# Patient Record
Sex: FEMALE | Race: WHITE | NOT HISPANIC OR LATINO | Employment: FULL TIME | URBAN - METROPOLITAN AREA
[De-identification: names, ages, dates, MRNs, and addresses within clinical notes are randomized per-mention and may not be internally consistent; named-entity substitution may affect disease eponyms.]

---

## 2017-05-11 ENCOUNTER — ALLSCRIPTS OFFICE VISIT (OUTPATIENT)
Dept: OTHER | Facility: OTHER | Age: 32
End: 2017-05-11

## 2017-05-11 DIAGNOSIS — L30.9 DERMATITIS: ICD-10-CM

## 2017-05-11 DIAGNOSIS — J30.9 ALLERGIC RHINITIS: ICD-10-CM

## 2017-05-11 DIAGNOSIS — J45.20 MILD INTERMITTENT ASTHMA, UNCOMPLICATED: ICD-10-CM

## 2017-05-11 DIAGNOSIS — E03.9 HYPOTHYROIDISM: ICD-10-CM

## 2017-05-15 ENCOUNTER — GENERIC CONVERSION - ENCOUNTER (OUTPATIENT)
Dept: OTHER | Facility: OTHER | Age: 32
End: 2017-05-15

## 2017-05-15 LAB
ADDITIONAL INFORMATION (HISTORICAL): NORMAL
ADEQUACY: (HISTORICAL): NORMAL
COMMENT (HISTORICAL): NORMAL
CYTOTECHNOLOGIST: (HISTORICAL): NORMAL
GENERAL CATEGORIZATION (HISTORICAL): NORMAL
INFECTION (HISTORICAL): NORMAL
PATHOLOGIST (HISTORICAL): NORMAL
PREV. BX: (HISTORICAL): NORMAL
PREV. PAP (HISTORICAL): NORMAL
REVIEWED BY (HISTORICAL): NORMAL
SOURCE (HISTORICAL): NORMAL
SPECIMEN STATUS REPORT (HISTORICAL): NORMAL

## 2017-06-26 ENCOUNTER — ALLSCRIPTS OFFICE VISIT (OUTPATIENT)
Dept: OTHER | Facility: OTHER | Age: 32
End: 2017-06-26

## 2017-07-16 ENCOUNTER — TRANSCRIBE ORDERS (OUTPATIENT)
Dept: ADMINISTRATIVE | Facility: HOSPITAL | Age: 32
End: 2017-07-16

## 2017-07-16 ENCOUNTER — APPOINTMENT (OUTPATIENT)
Dept: LAB | Facility: HOSPITAL | Age: 32
End: 2017-07-16
Payer: COMMERCIAL

## 2017-07-16 DIAGNOSIS — E03.9 HYPOTHYROIDISM: ICD-10-CM

## 2017-07-16 DIAGNOSIS — J45.20 MILD INTERMITTENT ASTHMA, UNCOMPLICATED: ICD-10-CM

## 2017-07-16 DIAGNOSIS — L30.9 DERMATITIS: ICD-10-CM

## 2017-07-16 DIAGNOSIS — J30.9 ALLERGIC RHINITIS: ICD-10-CM

## 2017-07-16 LAB
ALBUMIN SERPL BCP-MCNC: 3.7 G/DL (ref 3.5–5)
ALP SERPL-CCNC: 101 U/L (ref 46–116)
ALT SERPL W P-5'-P-CCNC: 35 U/L (ref 12–78)
ANION GAP SERPL CALCULATED.3IONS-SCNC: 9 MMOL/L (ref 4–13)
AST SERPL W P-5'-P-CCNC: 16 U/L (ref 5–45)
BASOPHILS # BLD AUTO: 0.02 THOUSANDS/ΜL (ref 0–0.1)
BASOPHILS NFR BLD AUTO: 0 % (ref 0–1)
BILIRUB SERPL-MCNC: 0.3 MG/DL (ref 0.2–1)
BUN SERPL-MCNC: 13 MG/DL (ref 5–25)
CALCIUM SERPL-MCNC: 8.7 MG/DL (ref 8.3–10.1)
CHLORIDE SERPL-SCNC: 107 MMOL/L (ref 100–108)
CHOLEST SERPL-MCNC: 129 MG/DL (ref 50–200)
CO2 SERPL-SCNC: 25 MMOL/L (ref 21–32)
CREAT SERPL-MCNC: 0.77 MG/DL (ref 0.6–1.3)
EOSINOPHIL # BLD AUTO: 0.3 THOUSAND/ΜL (ref 0–0.61)
EOSINOPHIL NFR BLD AUTO: 4 % (ref 0–6)
ERYTHROCYTE [DISTWIDTH] IN BLOOD BY AUTOMATED COUNT: 14.5 % (ref 11.6–15.1)
GFR SERPL CREATININE-BSD FRML MDRD: >60 ML/MIN/1.73SQ M
GLUCOSE P FAST SERPL-MCNC: 95 MG/DL (ref 65–99)
HCT VFR BLD AUTO: 43.1 % (ref 34.8–46.1)
HDLC SERPL-MCNC: 43 MG/DL (ref 40–60)
HGB BLD-MCNC: 13.6 G/DL (ref 11.5–15.4)
LDLC SERPL CALC-MCNC: 75 MG/DL (ref 0–100)
LYMPHOCYTES # BLD AUTO: 1.95 THOUSANDS/ΜL (ref 0.6–4.47)
LYMPHOCYTES NFR BLD AUTO: 26 % (ref 14–44)
MCH RBC QN AUTO: 30.4 PG (ref 26.8–34.3)
MCHC RBC AUTO-ENTMCNC: 31.6 G/DL (ref 31.4–37.4)
MCV RBC AUTO: 96 FL (ref 82–98)
MONOCYTES # BLD AUTO: 0.61 THOUSAND/ΜL (ref 0.17–1.22)
MONOCYTES NFR BLD AUTO: 8 % (ref 4–12)
NEUTROPHILS # BLD AUTO: 4.68 THOUSANDS/ΜL (ref 1.85–7.62)
NEUTS SEG NFR BLD AUTO: 62 % (ref 43–75)
PLATELET # BLD AUTO: 333 THOUSANDS/UL (ref 149–390)
PMV BLD AUTO: 9 FL (ref 8.9–12.7)
POTASSIUM SERPL-SCNC: 4.3 MMOL/L (ref 3.5–5.3)
PROT SERPL-MCNC: 7 G/DL (ref 6.4–8.2)
RBC # BLD AUTO: 4.47 MILLION/UL (ref 3.81–5.12)
SODIUM SERPL-SCNC: 141 MMOL/L (ref 136–145)
TRIGL SERPL-MCNC: 53 MG/DL
TSH SERPL DL<=0.05 MIU/L-ACNC: 1.65 UIU/ML (ref 0.36–3.74)
WBC # BLD AUTO: 7.56 THOUSAND/UL (ref 4.31–10.16)

## 2017-07-16 PROCEDURE — 80053 COMPREHEN METABOLIC PANEL: CPT

## 2017-07-16 PROCEDURE — 80061 LIPID PANEL: CPT

## 2017-07-16 PROCEDURE — 85025 COMPLETE CBC W/AUTO DIFF WBC: CPT

## 2017-07-16 PROCEDURE — 36415 COLL VENOUS BLD VENIPUNCTURE: CPT

## 2017-07-16 PROCEDURE — 84443 ASSAY THYROID STIM HORMONE: CPT

## 2017-07-18 ENCOUNTER — GENERIC CONVERSION - ENCOUNTER (OUTPATIENT)
Dept: OTHER | Facility: OTHER | Age: 32
End: 2017-07-18

## 2017-11-20 ENCOUNTER — ALLSCRIPTS OFFICE VISIT (OUTPATIENT)
Dept: OTHER | Facility: OTHER | Age: 32
End: 2017-11-20

## 2018-01-06 NOTE — PROGRESS NOTES
Assessment   1  Acute sinusitis (461 9) (J01 90)   2  Chapped lips (528 5) (K13 0)    Plan    Acute sinusitis    · Amoxicillin 875 MG Oral Tablet; TAKE 1 TABLET EVERY 12 HOURS DAILY   · Drink at least 6 glasses of water or juice a day ; Status:Complete;   Done: 19CNI0183   · Drink plenty of fluids ; Status:Complete;   Done: 42ABC7467   · How to use a nasal spray ; Status:Complete;   Done: 19MJJ2034   · Irrigate your nose twice a day ; Status:Complete;   Done: 10SCU4648   · Taking a hot steamy shower may help your condition ; Status:Complete;   Done:    61IVY9125   · Call [phone] if: The sinus pain is not better in 1 week ; Status:Complete;   Done:    77EOK8539   · Call [phone] if: The symptoms are not better in 7 days ; Status:Complete;   Done:    45HHD0132   · Call [phone] if: The symptoms come back after the medications are finished ;    Status:Complete;   Done: 25RKD1111   · Call [phone] if: Your sinus pain is worse ; Status:Complete;   Done: 47QGJ1384   · Seek Immediate Medical Attention if: You have a fever, headache, and vomiting, or have    a stiff neck ; Status:Complete;   Done: 45DJL4071   · Seek Immediate Medical Attention if: You have a severe headache that will not go away ;    Status:Complete;   Done: 73WPU7213   · Seek Immediate Medical Attention if: You have signs of infection in or around the affected    area ; Status:Complete;   Done: 37JLV5392  Asthma    · ProAir  (90 Base) MCG/ACT Inhalation Aerosol Solution; INHALE 2    PUFFS EVERY 4 HOURS AS NEEDED      Triamcinolone Acetonide 0 1 % External Cream; APPLY A THIN LAYER TO AFFECTED AREA(S) TWICE DAILY  Requested for: 02CGL7951; Last Rx:20Nov2017; Status: UNAUTHORIZED - Requires Signature Ordered     Rx By: Mecca Moreno;  Dispense: 23 Days ; #:1 X 45 GM Tube; Refill: 3;      For: Eczema; CK = N; Sent To: [facility] [facility]; Last Updated By: Alys Prader; 11/20/2017 12:11:22 PM       Discussion/Summary Discussed symptoms of sinus infection  Amoxicillin 10 days antibiotic to pharmacy  Advised to try zinc based lip preparations or B vitamin  Can try zinc based at night  Fluids  Rest  Call or follow-up if symptoms worsen or persist     Possible side effects of new medications were reviewed with the patient/guardian today  The treatment plan was reviewed with the patient/guardian  The patient/guardian understands and agrees with the treatment plan      Chief Complaint   CC patient here today for sick visit  Started last week but felt really bad on friday  started wth a cough, headache and has post nasal drip  History of Present Illness   HPI: Patient presents today for sick visit  Feels it is in ears  Been taking over the counter but not working  Feels it is in her throat, coughing up yellow mucous  Yellow phlegm in throat  Fluid in ears  Lips itch, burn, crack, and dry  She started with symptoms about 1 week ago  Worsened over the last 3-4 days  Cough has been productive of yellowish sputum  Purulent nasal discharge  Some pressure into her ears and sinuses  Lips have been very cracked and sore  Low-grade fever on and off  Slight body aches  Review of Systems        Constitutional: fever-- and-- feeling poorly, but-- as noted in HPI-- and-- no chills  ENT: earache,-- sore throat,-- nasal discharge-- and-- hoarseness, but-- as noted in HPI  Cardiovascular: no chest pain-- and-- no palpitations  Respiratory: cough, but-- as noted in HPI-- and-- no PND  Gastrointestinal: no nausea-- and-- no diarrhea  Musculoskeletal: joint stiffness  Neurological: headache  ROS reviewed  Active Problems   1  Allergic rhinitis (477 9) (J30 9)   2  Asthma (493 90) (J45 909)   3  Eczema (692 9) (L30 9)   4  Hypothyroidism (244 9) (E03 9)   5   Mild intermittent asthma without complication (195 43) (O89 80)    Past Medical History   Active Problems And Past Medical History Reviewed: The active problems and past medical history were reviewed and updated today  Family History   Sister    1  Family history of malignant neoplasm (V16 9) (Z80 9)  Paternal Grandfather    2  Family history of diabetes mellitus (V18 0) (Z83 3)    Social History    · Current every day smoker (305 1) (F17 200)   ·     Surgical History   1  Denied: History Of Prior Surgery    Current Meds    1  Betamethasone Dipropionate Aug 0 05 % External Cream; APPLY AND GENTLY     MASSAGE INTO AFFECTED AREA(S) TWICE DAILY AS NEEDED; Therapy: 84SFD7220 to (Last Rx:37Dma4117)  Requested for: 85PXA3178 Ordered   2  Levothyroxine Sodium 50 MCG Oral Tablet; take 1 tablet by mouth once daily; Therapy: 66SDM3585 to ([facility] L[facility])  Requested for: 41KHJ0281; Last     Rx:08Nov2017 Ordered   3  ProAir  (90 Base) MCG/ACT Inhalation Aerosol Solution; INHALE 2 PUFFS     EVERY 4 HOURS AS NEEDED  Requested for: 72WND2536; Last Rx:11May2017     Ordered   4  Triamcinolone Acetonide 0 1 % External Cream; APPLY A THIN LAYER TO AFFECTED     AREA(S) TWICE DAILY  Requested for: 64BEP8599; Last Rx:06Igv1276 Ordered     The medication list was reviewed and updated today  Allergies   1  Animal dander - Cats   2  Animal dander - Dogs   3  Nuts   4  Seasonal    Vitals    Recorded: 20Nov2017 10:55AM   Temperature 99 2 F, Temporal   Heart Rate 77   Respiration 16   Systolic 462, LUE, Sitting   Diastolic 80, LUE, Sitting   Height 5 ft 5 in   Weight 168 lb    BMI Calculated 27 96   BSA Calculated 1 84   O2 Saturation 98     Physical Exam        Constitutional      General appearance: Abnormal   appears tired-- and-- well hydrated  Eyes      Conjunctiva and lids: No swelling, erythema or discharge  Ears, Nose, Mouth, and Throat      External inspection of ears and nose: Normal        Otoscopic examination: Abnormal  -- Slight effusion bilaterally        Nasal mucosa, septum, and turbinates: Abnormal  -- Purulent nasal discharge bilaterally  Oropharynx: Abnormal  -- Posterior pharyngeal erythema  Pulmonary      Auscultation of lungs: Clear to auscultation  Cardiovascular      Auscultation of heart: Normal rate and rhythm, normal S1 and S2, without murmurs  Lymphatic      Palpation of lymph nodes in neck: No lymphadenopathy            Signatures    Electronically signed by : ASHLY Phelan ; Jan 5 2018  9:26AM EST                       (Author)

## 2018-01-10 NOTE — RESULT NOTES
Verified Results  (1) CBC/PLT/DIFF 91KAV9648 09:05AM [facility] Order Number: [id number]     Test Name Result Flag Reference   WBC COUNT 7 56 Thousand/uL  4 31-10 16   RBC COUNT 4 47 Million/uL  3 81-5 12   HEMOGLOBIN 13 6 g/dL  11 5-15 4   HEMATOCRIT 43 1 %  34 8-46  1   MCV 96 fL  82-98   MCH 30 4 pg  26 8-34 3   MCHC 31 6 g/dL  31 4-37 4   RDW 14 5 %  11 6-15 1   MPV 9 0 fL  8 9-12 7   PLATELET COUNT 893 Thousands/uL  149-390   NEUTROPHILS RELATIVE PERCENT 62 %  43-75   LYMPHOCYTES RELATIVE PERCENT 26 %  14-44   MONOCYTES RELATIVE PERCENT 8 %  4-12   EOSINOPHILS RELATIVE PERCENT 4 %  0-6   BASOPHILS RELATIVE PERCENT 0 %  0-1   NEUTROPHILS ABSOLUTE COUNT 4 68 Thousands/? ??L  1 85-7 62   LYMPHOCYTES ABSOLUTE COUNT 1 95 Thousands/? ??L  0 60-4 47   MONOCYTES ABSOLUTE COUNT 0 61 Thousand/? ??L  0 17-1 22   EOSINOPHILS ABSOLUTE COUNT 0 30 Thousand/? ??L  0 00-0 61   BASOPHILS ABSOLUTE COUNT 0 02 Thousands/? ??L  0 00-0 10     (1) COMPREHENSIVE [facility]ABOL[facility]NT1210 [facility]: [id number]     Test Name Result Flag Reference   SODIUM 141 mmol/L  136-145   POTASSIUM 4 3 mmol/L  3 5-5 3   CHLORIDE 107 mmol/L  100-108   CARBON DIOXIDE 25 mmol/L  21-32   ANION GAP (CALC) 9 mmol/L  4-13   BLOOD UREA NITROGEN 13 mg/dL  5-25   CREATININE 0 77 mg/dL  0 60-1 30   Standardized to IDMS reference method   CALCIUM 8 7 mg/dL  8 3-10 1   BILI, TOTAL 0 30 mg/dL  0 20-1 00   ALK PHOSPHATAS 101 U/L     ALT (SGPT) 35 U/L  12-78   AST(SGOT) 16 U/L  5-45   ALBUMIN 3 7 g/dL  3 5-5 0   TOTAL PROTEIN 7 0 g/dL  6 4-8 2   eGFR Non-African American      >60 0 ml/min/1 73sq m   [facility] Disease Education Program recommendations are as follows:  GFR calculation is accurate only with a steady state creatinine  Chronic Kidney disease less than 60 ml/min/1 73 sq  meters  Kidney failure less than 15 ml/min/1 73 sq  meters     GLUCOSE FASTING 95 mg/dL  65-99     (1) LIPID PANEL, FASTING 15FMM6272 09:05AM [facility] Order Number: [id number]     Test Name Result Flag Reference   CHOLESTEROL 129 mg/dL     HDL,DIRECT 43 mg/dL  40-60   Specimen collection should occur prior to Metamizole administration due to the potential for falsely depressed results  LDL CHOLESTEROL CALCULATED 75 mg/dL  0-100   Triglyceride:         Normal              <150 mg/dl       Borderline High    150-199 mg/dl       High               200-499 mg/dl       Very High          >499 mg/dl  Cholesterol:         Desirable        <200 mg/dl      Borderline High  200-239 mg/dl      High             >239 mg/dl  HDL Cholesterol:        High    >59 mg/dL      Low     <41 mg/dL  LDL CALCULATED:    This screening LDL is a calculated result  It does not have the accuracy of the Direct Measured LDL in the monitoring of patients with hyperlipidemia and/or statin therapy  Direct Measure LDL (CRK498) must be ordered separately in these patients  TRIGLYCERIDES 53 mg/dL  <=150   Specimen collection should occur prior to N-Acetylcysteine or Metamizole administration due to the potential for falsely depressed results  (1) TSH 68VRI9422 09:05AM [facility] Order Number: [id number]     Test Name Result Flag Reference   TSH 1 652 uIU/mL  0 358-3 740   Patients undergoing fluorescein dye angiography may retain small amounts of fluorescein in the body for 48-72 hours post procedure  Samples containing fluorescein can produce falsely depressed TSH values  If the patient had this procedure,a specimen should be resubmitted post fluorescein clearance            The recommended reference ranges for TSH during pregnancy are as follows:  First trimester 0 1 to 2 5 uIU/mL  Second trimester  0 2 to 3 0 uIU/mL  Third trimester 0 3 to 3 0 uIU/m

## 2018-01-13 VITALS
RESPIRATION RATE: 16 BRPM | WEIGHT: 169 LBS | SYSTOLIC BLOOD PRESSURE: 110 MMHG | TEMPERATURE: 97.5 F | DIASTOLIC BLOOD PRESSURE: 62 MMHG | HEIGHT: 65 IN | BODY MASS INDEX: 28.16 KG/M2 | OXYGEN SATURATION: 98 % | HEART RATE: 86 BPM

## 2018-01-14 VITALS
SYSTOLIC BLOOD PRESSURE: 112 MMHG | HEART RATE: 82 BPM | RESPIRATION RATE: 16 BRPM | BODY MASS INDEX: 28.16 KG/M2 | DIASTOLIC BLOOD PRESSURE: 64 MMHG | TEMPERATURE: 98.2 F | HEIGHT: 65 IN | WEIGHT: 169 LBS | OXYGEN SATURATION: 98 %

## 2018-01-14 VITALS
HEART RATE: 77 BPM | HEIGHT: 65 IN | WEIGHT: 168 LBS | OXYGEN SATURATION: 98 % | TEMPERATURE: 99.2 F | RESPIRATION RATE: 16 BRPM | SYSTOLIC BLOOD PRESSURE: 110 MMHG | BODY MASS INDEX: 27.99 KG/M2 | DIASTOLIC BLOOD PRESSURE: 80 MMHG

## 2018-01-30 RX ORDER — BETAMETHASONE DIPROPIONATE 0.5 MG/G
CREAM TOPICAL
COMMUNITY
Start: 2017-05-11 | End: 2018-01-31 | Stop reason: SDUPTHER

## 2018-01-31 DIAGNOSIS — L30.8 OTHER ECZEMA: Primary | ICD-10-CM

## 2018-01-31 RX ORDER — BETAMETHASONE DIPROPIONATE 0.5 MG/G
CREAM TOPICAL DAILY
Qty: 50 G | Refills: 5 | Status: SHIPPED | OUTPATIENT
Start: 2018-01-31 | End: 2018-06-20

## 2018-03-15 ENCOUNTER — TELEPHONE (OUTPATIENT)
Dept: INTERNAL MEDICINE CLINIC | Facility: CLINIC | Age: 33
End: 2018-03-15

## 2018-03-15 DIAGNOSIS — N92.6 MISSED PERIOD: Primary | ICD-10-CM

## 2018-03-15 NOTE — TELEPHONE ENCOUNTER
Patient called would like her thyroid checked because she missed her period and took pregnancy test 3 times -negative  She stated that last time her period was off it was her thyroid  After speaking to you I told her that you were putting orders in for the blood work and we will call her with results  Patient stated at that time she will make an appt to see you

## 2018-05-28 RX ORDER — LEVOTHYROXINE SODIUM 0.05 MG/1
TABLET ORAL
Qty: 30 TABLET | Refills: 5 | OUTPATIENT
Start: 2018-05-28

## 2018-06-07 DIAGNOSIS — E03.9 HYPOTHYROIDISM, UNSPECIFIED TYPE: Primary | ICD-10-CM

## 2018-06-07 RX ORDER — LEVOTHYROXINE SODIUM 0.05 MG/1
50 TABLET ORAL DAILY
Qty: 30 TABLET | Refills: 5 | Status: SHIPPED | OUTPATIENT
Start: 2018-06-07 | End: 2018-06-20

## 2018-06-07 RX ORDER — LEVOTHYROXINE SODIUM 0.05 MG/1
TABLET ORAL
Qty: 30 TABLET | Refills: 5 | Status: SHIPPED | OUTPATIENT
Start: 2018-06-07 | End: 2019-07-02 | Stop reason: SDUPTHER

## 2018-06-07 RX ORDER — LEVOTHYROXINE SODIUM 0.05 MG/1
1 TABLET ORAL DAILY
COMMUNITY
Start: 2017-11-08 | End: 2018-06-07 | Stop reason: SDUPTHER

## 2018-06-07 NOTE — TELEPHONE ENCOUNTER
Patient called looking for a refill on her Synthroid  She stated that she was being prescribed by Dr Frank Jo

## 2018-06-20 ENCOUNTER — OFFICE VISIT (OUTPATIENT)
Dept: INTERNAL MEDICINE CLINIC | Facility: CLINIC | Age: 33
End: 2018-06-20
Payer: COMMERCIAL

## 2018-06-20 VITALS
SYSTOLIC BLOOD PRESSURE: 102 MMHG | OXYGEN SATURATION: 97 % | WEIGHT: 168.7 LBS | HEIGHT: 65 IN | HEART RATE: 80 BPM | TEMPERATURE: 98 F | BODY MASS INDEX: 28.11 KG/M2 | DIASTOLIC BLOOD PRESSURE: 68 MMHG

## 2018-06-20 DIAGNOSIS — J45.20 MILD INTERMITTENT ASTHMA WITHOUT COMPLICATION: ICD-10-CM

## 2018-06-20 DIAGNOSIS — Z13.6 SCREENING FOR CARDIOVASCULAR CONDITION: ICD-10-CM

## 2018-06-20 DIAGNOSIS — E03.9 HYPOTHYROIDISM, UNSPECIFIED TYPE: ICD-10-CM

## 2018-06-20 DIAGNOSIS — Z00.00 ROUTINE PHYSICAL EXAMINATION: Primary | ICD-10-CM

## 2018-06-20 DIAGNOSIS — J30.1 SEASONAL ALLERGIC RHINITIS DUE TO POLLEN: ICD-10-CM

## 2018-06-20 DIAGNOSIS — Z13.5 SCREENING FOR EYE CONDITION: ICD-10-CM

## 2018-06-20 DIAGNOSIS — J45.20 MILD INTERMITTENT ASTHMA, UNSPECIFIED WHETHER COMPLICATED: ICD-10-CM

## 2018-06-20 PROBLEM — J30.9 ALLERGIC RHINITIS: Status: ACTIVE | Noted: 2017-05-11

## 2018-06-20 PROBLEM — L30.9 ECZEMA: Status: ACTIVE | Noted: 2017-04-07

## 2018-06-20 PROBLEM — J45.909 ASTHMA: Status: ACTIVE | Noted: 2017-05-11

## 2018-06-20 PROBLEM — K13.0 CHAPPED LIPS: Status: ACTIVE | Noted: 2017-11-20

## 2018-06-20 PROCEDURE — 99395 PREV VISIT EST AGE 18-39: CPT | Performed by: NURSE PRACTITIONER

## 2018-06-20 RX ORDER — ALBUTEROL SULFATE 90 UG/1
2 AEROSOL, METERED RESPIRATORY (INHALATION) EVERY 6 HOURS PRN
Qty: 2 INHALER | Refills: 3 | Status: SHIPPED | OUTPATIENT
Start: 2018-06-20 | End: 2021-03-08 | Stop reason: SDUPTHER

## 2018-06-20 RX ORDER — ALBUTEROL SULFATE 90 UG/1
2 AEROSOL, METERED RESPIRATORY (INHALATION) EVERY 4 HOURS PRN
COMMUNITY
End: 2018-06-20 | Stop reason: SDUPTHER

## 2018-06-20 RX ORDER — TRIAMCINOLONE ACETONIDE 1 MG/G
CREAM TOPICAL 2 TIMES DAILY
COMMUNITY
End: 2019-11-26 | Stop reason: SDUPTHER

## 2018-06-20 NOTE — PROGRESS NOTES
Assessment/Plan: Anticipatory guidance re: diet, exercise, and safety  Will repeat TSH and other fasting labs  Will give referral for routine eye exam and she is following up with the GYN coming up  Will bring back in one year or sooner if need be  No problem-specific Assessment & Plan notes found for this encounter  Problem List Items Addressed This Visit     Allergic rhinitis    Relevant Orders    Comprehensive metabolic panel    CBC and differential    Asthma    Relevant Medications    albuterol (PROAIR HFA) 90 mcg/act inhaler    Other Relevant Orders    Comprehensive metabolic panel    CBC and differential    Comprehensive metabolic panel    CBC and differential    Hypothyroidism    Relevant Orders    Comprehensive metabolic panel    CBC and differential    Mild intermittent asthma without complication    Relevant Medications    albuterol (PROAIR HFA) 90 mcg/act inhaler    Other Relevant Orders    Comprehensive metabolic panel    CBC and differential      Other Visit Diagnoses     Routine physical examination    -  Primary    Screening for eye condition        Relevant Orders    Ambulatory referral to Ophthalmology    Screening for cardiovascular condition        Relevant Orders    Lipid panel            Subjective:      Patient ID: Veronica Dallas is a 26 y o  female  Kalyan Nolasco is here today for a follow up visit  She is doing well today and has not had her thyroid checked yet  She is taking Synthroid 50 mcg daily and states she will have this done soon  She does need a physical for work today  She denies any chest pain, SOB, or palpitations  She denies any depression or anxiety  She is eating a well balanced diet and does try to exercise when she can  She does see a dentist on a regular basis and has not had her vision checked  She would like a referral   She has not had a GYN exam in over one year and does see Dr Vignesh Kwok    She does have regular menses and denies any heavy bleeding or cramping  She denies any insomnia, constipation, or diarrhea  She offers no other complaints today  The following portions of the patient's history were reviewed and updated as appropriate:   She  has no past medical history on file  She   Patient Active Problem List    Diagnosis Date Noted    Chapped lips 11/20/2017    Allergic rhinitis 05/11/2017    Asthma 05/11/2017    Eczema 04/07/2017    Hypothyroidism 10/12/2016    Mild intermittent asthma without complication 44/33/5383     She  has no past surgical history on file  Her family history includes Cancer in her sister; Diabetes in her paternal grandfather  She  reports that she has been smoking  She has never used smokeless tobacco  She reports that she does not drink alcohol or use drugs  Current Outpatient Prescriptions   Medication Sig Dispense Refill    albuterol (PROAIR HFA) 90 mcg/act inhaler Inhale 2 puffs every 6 (six) hours as needed for shortness of breath 2 Inhaler 3    levothyroxine 50 mcg tablet Take 1 tablet daily 30 tablet 5    triamcinolone (KENALOG) 0 1 % cream Apply topically 2 (two) times a day       No current facility-administered medications for this visit  Current Outpatient Prescriptions on File Prior to Visit   Medication Sig    levothyroxine 50 mcg tablet Take 1 tablet daily    [DISCONTINUED] betamethasone, augmented, (DIPROLENE-AF) 0 05 % cream Apply topically daily    [DISCONTINUED] levothyroxine 50 mcg tablet Take 1 tablet (50 mcg total) by mouth daily     No current facility-administered medications on file prior to visit  She is allergic to cat hair extract; dog epithelium; nuts; and pollen extract       Review of Systems   All other systems reviewed and are negative          Objective:      /68 (BP Location: Right arm, Patient Position: Sitting, Cuff Size: Adult)   Pulse 80   Temp 98 °F (36 7 °C) (Temporal)   Ht 5' 5" (1 651 m)   Wt 76 5 kg (168 lb 11 2 oz)   SpO2 97%   BMI 28 07 kg/m² Physical Exam   Constitutional: She is oriented to person, place, and time  She appears well-developed and well-nourished  HENT:   Head: Normocephalic and atraumatic  Right Ear: External ear normal    Left Ear: External ear normal    Nose: Nose normal    Mouth/Throat: Oropharynx is clear and moist    Eyes: Conjunctivae and EOM are normal  Pupils are equal, round, and reactive to light  Neck: Normal range of motion  Neck supple  Cardiovascular: Normal rate, regular rhythm, normal heart sounds and intact distal pulses  Pulmonary/Chest: Effort normal and breath sounds normal    Abdominal: Soft  Bowel sounds are normal    Musculoskeletal: Normal range of motion  Neurological: She is alert and oriented to person, place, and time  She has normal reflexes  Skin: Skin is warm and dry  Psychiatric: She has a normal mood and affect  Her behavior is normal  Judgment and thought content normal    Vitals reviewed

## 2018-06-20 NOTE — PATIENT INSTRUCTIONS
Wellness Visit for Adults   WHAT YOU NEED TO KNOW:   What is a wellness visit? A wellness visit is when you see your healthcare provider to get screened for health problems  You can also get advice on how to stay healthy  Write down your questions so you remember to ask them  Ask your healthcare provider how often you should have a wellness visit  What happens at a wellness visit? Your healthcare provider will ask about your health, and your family history of health problems  This includes high blood pressure, heart disease, and cancer  He or she will ask if you have symptoms that concern you, if you smoke, and about your mood  You may also be asked about your intake of medicines, supplements, food, and alcohol  Any of the following may be done:  · Your weight  will be checked  Your height may also be checked so your body mass index (BMI) can be calculated  Your BMI shows if you are at a healthy weight  · Your blood pressure  and heart rate will be checked  Your temperature may also be checked  · Blood and urine tests  may be done  Blood tests may be done to check your cholesterol levels  Abnormal cholesterol levels increase your risk for heart disease and stroke  You may also need a blood or urine test to check for diabetes if you are at increased risk  Urine tests may be done to look for signs of an infection or kidney disease  · A physical exam  includes checking your heartbeat and lungs with a stethoscope  Your healthcare provider may also check your skin to look for sun damage  · Screening tests  may be recommended  A screening test is done to check for diseases that may not cause symptoms  The screening tests you may need depend on your age, gender, family history, and lifestyle habits  For example, colorectal screening may be recommended if you are 48years old or older  What screening tests do I need if I am a woman? · A Pap smear  is used to screen for cervical cancer   Pap smears are usually done every 3 to 5 years depending on your age  You may need them more often if you have had abnormal Pap smear test results in the past  Ask your healthcare provider how often you should have a Pap smear  · A mammogram  is an x-ray of your breasts to screen for breast cancer  Experts recommend mammograms every 2 years starting at age 48 years  You may need a mammogram at age 52 years or younger if you have an increased risk for breast cancer  Talk to your healthcare provider about when you should start having mammograms and how often you need them  What vaccines might I need? · Get an influenza vaccine  every year  The influenza vaccine protects you from the flu  Several types of viruses cause the flu  The viruses change over time, so new vaccines are made each year  · Get a tetanus-diphtheria (Td) booster vaccine  every 10 years  This vaccine protects you against tetanus and diphtheria  Tetanus is a severe infection that may cause painful muscle spasms and lockjaw  Diphtheria is a severe bacterial infection that causes a thick covering in the back of your mouth and throat  · Get a human papillomavirus (HPV) vaccine  if you are female and aged 23 to 32 or male 23 to 24 and never received it  This vaccine protects you from HPV infection  HPV is the most common infection spread by sexual contact  HPV may also cause vaginal, penile, and anal cancers  · Get a pneumococcal vaccine  if you are aged 72 years or older  The pneumococcal vaccine is an injection given to protect you from pneumococcal disease  Pneumococcal disease is an infection caused by pneumococcal bacteria  The infection may cause pneumonia, meningitis, or an ear infection  · Get a shingles vaccine  if you are aged 61 or older, even if you have had shingles before  The shingles vaccine is an injection to protect you from the varicella-zoster virus  This is the same virus that causes chickenpox   Shingles is a painful rash that develops in people who had chickenpox or have been exposed to the virus  How can I eat healthy? My Plate is a model for planning healthy meals  It shows the types and amounts of foods that should go on your plate  Fruits and vegetables make up about half of your plate, and grains and protein make up the other half  A serving of dairy is included on the side of your plate  The amount of calories and serving sizes you need depends on your age, gender, weight, and height  Examples of healthy foods are listed below:  · Eat a variety of vegetables  such as dark green, red, and orange vegetables  You can also include canned vegetables low in sodium (salt) and frozen vegetables without added butter or sauces  · Eat a variety of fresh fruits , canned fruit in 100% juice, frozen fruit, and dried fruit  · Include whole grains  At least half of the grains you eat should be whole grains  Examples include whole-wheat bread, wheat pasta, brown rice, and whole-grain cereals such as oatmeal     · Eat a variety of protein foods such as seafood (fish and shellfish), lean meat, and poultry without skin (turkey and chicken)  Examples of lean meats include pork leg, shoulder, or tenderloin, and beef round, sirloin, tenderloin, and extra lean ground beef  Other protein foods include eggs and egg substitutes, beans, peas, soy products, nuts, and seeds  · Choose low-fat dairy products such as skim or 1% milk or low-fat yogurt, cheese, and cottage cheese  · Limit unhealthy fats  such as butter, hard margarine, and shortening  How much exercise do I need? Exercise at least 30 minutes per day on most days of the week  Some examples of exercise include walking, biking, dancing, and swimming  You can also fit in more physical activity by taking the stairs instead of the elevator or parking farther away from stores  Include muscle strengthening activities 2 days each week  Regular exercise provides many health benefits  It helps you manage your weight, and decreases your risk for type 2 diabetes, heart disease, stroke, and high blood pressure  Exercise can also help improve your mood  Ask your healthcare provider about the best exercise plan for you  What are some general health and safety guidelines I should follow? · Do not smoke  Nicotine and other chemicals in cigarettes and cigars can cause lung damage  Ask your healthcare provider for information if you currently smoke and need help to quit  E-cigarettes or smokeless tobacco still contain nicotine  Talk to your healthcare provider before you use these products  · Limit alcohol  A drink of alcohol is 12 ounces of beer, 5 ounces of wine, or 1½ ounces of liquor  · Lose weight, if needed  Being overweight increases your risk of certain health conditions  These include heart disease, high blood pressure, type 2 diabetes, and certain types of cancer  · Protect your skin  Do not sunbathe or use tanning beds  Use sunscreen with a SPF 15 or higher  Apply sunscreen at least 15 minutes before you go outside  Reapply sunscreen every 2 hours  Wear protective clothing, hats, and sunglasses when you are outside  · Drive safely  Always wear your seatbelt  Make sure everyone in your car wears a seatbelt  A seatbelt can save your life if you are in an accident  Do not use your cell phone when you are driving  This could distract you and cause an accident  Pull over if you need to make a call or send a text message  · Practice safe sex  Use latex condoms if are sexually active and have more than one partner  Your healthcare provider may recommend screening tests for sexually transmitted infections (STIs)  · Wear helmets, lifejackets, and protective gear  Always wear a helmet when you ride a bike or motorcycle, go skiing, or play sports that could cause a head injury  Wear protective equipment when you play sports   Wear a lifejacket when you are on a boat or doing water sports  CARE AGREEMENT:   You have the right to help plan your care  Learn about your health condition and how it may be treated  Discuss treatment options with your caregivers to decide what care you want to receive  You always have the right to refuse treatment  The above information is an  only  It is not intended as medical advice for individual conditions or treatments  Talk to your doctor, nurse or pharmacist before following any medical regimen to see if it is safe and effective for you  © 2017 [employer] Information is for End User's use only and may not be sold, redistributed or otherwise used for commercial purposes  All illustrations and images included in CareNotes® are the copyrighted property of [employer]  or Tima Thomas

## 2018-06-29 ENCOUNTER — APPOINTMENT (EMERGENCY)
Dept: CT IMAGING | Facility: HOSPITAL | Age: 33
End: 2018-06-29
Payer: COMMERCIAL

## 2018-06-29 ENCOUNTER — APPOINTMENT (EMERGENCY)
Dept: RADIOLOGY | Facility: HOSPITAL | Age: 33
End: 2018-06-29
Payer: COMMERCIAL

## 2018-06-29 ENCOUNTER — HOSPITAL ENCOUNTER (EMERGENCY)
Facility: HOSPITAL | Age: 33
Discharge: HOME | End: 2018-06-29
Attending: EMERGENCY MEDICINE | Admitting: EMERGENCY MEDICINE
Payer: COMMERCIAL

## 2018-06-29 VITALS
OXYGEN SATURATION: 100 % | WEIGHT: 170.19 LBS | BODY MASS INDEX: 28.32 KG/M2 | HEART RATE: 73 BPM | RESPIRATION RATE: 16 BRPM | TEMPERATURE: 97.4 F | DIASTOLIC BLOOD PRESSURE: 54 MMHG | SYSTOLIC BLOOD PRESSURE: 89 MMHG

## 2018-06-29 DIAGNOSIS — R55 SYNCOPE: Primary | ICD-10-CM

## 2018-06-29 LAB
ALBUMIN SERPL BCP-MCNC: 3.8 G/DL (ref 3.5–5)
ALP SERPL-CCNC: 81 U/L (ref 46–116)
ALT SERPL W P-5'-P-CCNC: 26 U/L (ref 12–78)
ANION GAP SERPL CALCULATED.3IONS-SCNC: 11 MMOL/L (ref 4–13)
AST SERPL W P-5'-P-CCNC: 15 U/L (ref 5–45)
BASOPHILS # BLD AUTO: 0.02 THOUSANDS/ΜL (ref 0–0.1)
BASOPHILS NFR BLD AUTO: 0 % (ref 0–1)
BILIRUB SERPL-MCNC: 0.6 MG/DL (ref 0.2–1)
BILIRUB UR QL STRIP: NEGATIVE
BUN SERPL-MCNC: 10 MG/DL (ref 5–25)
CALCIUM SERPL-MCNC: 8.9 MG/DL (ref 8.3–10.1)
CHLORIDE SERPL-SCNC: 103 MMOL/L (ref 100–108)
CLARITY UR: CLEAR
CO2 SERPL-SCNC: 27 MMOL/L (ref 21–32)
COLOR UR: COLORLESS
CREAT SERPL-MCNC: 0.82 MG/DL (ref 0.6–1.3)
EOSINOPHIL # BLD AUTO: 0.17 THOUSAND/ΜL (ref 0–0.61)
EOSINOPHIL NFR BLD AUTO: 2 % (ref 0–6)
ERYTHROCYTE [DISTWIDTH] IN BLOOD BY AUTOMATED COUNT: 13.3 % (ref 11.6–15.1)
EXT PREG TEST URINE: NEGATIVE
GFR SERPL CREATININE-BSD FRML MDRD: 93 ML/MIN/1.73SQ M
GLUCOSE SERPL-MCNC: 102 MG/DL (ref 65–140)
GLUCOSE UR STRIP-MCNC: NEGATIVE MG/DL
HCT VFR BLD AUTO: 44.1 % (ref 34.8–46.1)
HGB BLD-MCNC: 14.5 G/DL (ref 11.5–15.4)
HGB UR QL STRIP.AUTO: NEGATIVE
KETONES UR STRIP-MCNC: NEGATIVE MG/DL
LEUKOCYTE ESTERASE UR QL STRIP: NEGATIVE
LIPASE SERPL-CCNC: 75 U/L (ref 73–393)
LYMPHOCYTES # BLD AUTO: 2.42 THOUSANDS/ΜL (ref 0.6–4.47)
LYMPHOCYTES NFR BLD AUTO: 25 % (ref 14–44)
MCH RBC QN AUTO: 32.3 PG (ref 26.8–34.3)
MCHC RBC AUTO-ENTMCNC: 32.9 G/DL (ref 31.4–37.4)
MCV RBC AUTO: 98 FL (ref 82–98)
MONOCYTES # BLD AUTO: 0.8 THOUSAND/ΜL (ref 0.17–1.22)
MONOCYTES NFR BLD AUTO: 8 % (ref 4–12)
NEUTROPHILS # BLD AUTO: 6.39 THOUSANDS/ΜL (ref 1.85–7.62)
NEUTS SEG NFR BLD AUTO: 65 % (ref 43–75)
NITRITE UR QL STRIP: NEGATIVE
PH UR STRIP.AUTO: 7 [PH] (ref 4.5–8)
PLATELET # BLD AUTO: 328 THOUSANDS/UL (ref 149–390)
PMV BLD AUTO: 9.4 FL (ref 8.9–12.7)
POTASSIUM SERPL-SCNC: 3.8 MMOL/L (ref 3.5–5.3)
PROT SERPL-MCNC: 7.5 G/DL (ref 6.4–8.2)
PROT UR STRIP-MCNC: NEGATIVE MG/DL
RBC # BLD AUTO: 4.49 MILLION/UL (ref 3.81–5.12)
SODIUM SERPL-SCNC: 141 MMOL/L (ref 136–145)
SP GR UR STRIP.AUTO: <=1.005 (ref 1–1.03)
TROPONIN I SERPL-MCNC: <0.02 NG/ML
UROBILINOGEN UR QL STRIP.AUTO: 0.2 E.U./DL
WBC # BLD AUTO: 9.8 THOUSAND/UL (ref 4.31–10.16)

## 2018-06-29 PROCEDURE — 80053 COMPREHEN METABOLIC PANEL: CPT | Performed by: EMERGENCY MEDICINE

## 2018-06-29 PROCEDURE — 81003 URINALYSIS AUTO W/O SCOPE: CPT | Performed by: EMERGENCY MEDICINE

## 2018-06-29 PROCEDURE — 99285 EMERGENCY DEPT VISIT HI MDM: CPT

## 2018-06-29 PROCEDURE — 93005 ELECTROCARDIOGRAM TRACING: CPT

## 2018-06-29 PROCEDURE — 74176 CT ABD & PELVIS W/O CONTRAST: CPT

## 2018-06-29 PROCEDURE — 71046 X-RAY EXAM CHEST 2 VIEWS: CPT

## 2018-06-29 PROCEDURE — 96374 THER/PROPH/DIAG INJ IV PUSH: CPT

## 2018-06-29 PROCEDURE — 96361 HYDRATE IV INFUSION ADD-ON: CPT

## 2018-06-29 PROCEDURE — 84484 ASSAY OF TROPONIN QUANT: CPT | Performed by: EMERGENCY MEDICINE

## 2018-06-29 PROCEDURE — 81025 URINE PREGNANCY TEST: CPT | Performed by: EMERGENCY MEDICINE

## 2018-06-29 PROCEDURE — 83690 ASSAY OF LIPASE: CPT | Performed by: EMERGENCY MEDICINE

## 2018-06-29 PROCEDURE — 85025 COMPLETE CBC W/AUTO DIFF WBC: CPT | Performed by: EMERGENCY MEDICINE

## 2018-06-29 RX ORDER — SODIUM CHLORIDE 9 MG/ML
1000 INJECTION, SOLUTION INTRAVENOUS ONCE
Status: COMPLETED | OUTPATIENT
Start: 2018-06-29 | End: 2018-06-29

## 2018-06-29 RX ORDER — KETOROLAC TROMETHAMINE 30 MG/ML
30 INJECTION, SOLUTION INTRAMUSCULAR; INTRAVENOUS ONCE
Status: COMPLETED | OUTPATIENT
Start: 2018-06-29 | End: 2018-06-29

## 2018-06-29 RX ADMIN — SODIUM CHLORIDE 1000 ML/HR: 0.9 INJECTION, SOLUTION INTRAVENOUS at 09:02

## 2018-06-29 RX ADMIN — KETOROLAC TROMETHAMINE 30 MG: 30 INJECTION, SOLUTION INTRAMUSCULAR at 07:29

## 2018-06-29 RX ADMIN — SODIUM CHLORIDE 1000 ML: 0.9 INJECTION, SOLUTION INTRAVENOUS at 07:27

## 2018-06-29 RX ADMIN — SODIUM CHLORIDE 1000 ML/HR: 0.9 INJECTION, SOLUTION INTRAVENOUS at 08:23

## 2018-06-29 NOTE — DISCHARGE INSTRUCTIONS
Syncope   WHAT YOU NEED TO KNOW:   Syncope is also called fainting or passing out  Syncope is a sudden, temporary loss of consciousness, followed by a fall from a standing or sitting position  Syncope ranges from not serious to a sign of a more serious condition that needs to be treated  You can control some health conditions that cause syncope  Your healthcare providers can help you create a plan to manage syncope and prevent episodes  DISCHARGE INSTRUCTIONS:   Seek care immediately if:   · You are bleeding because you hit your head when you fainted  · You suddenly have double vision, difficulty speaking, numbness, and cannot move your arms or legs  · You have chest pain and trouble breathing  · You vomit blood or material that looks like coffee grounds  · You see blood in your bowel movement  Contact your healthcare provider if:   · You have new or worsening symptoms  · You have another syncope episode  · You have a headache, fast heartbeat, or feel too dizzy to stand up  · You have questions or concerns about your condition or care  Follow up with your healthcare provider as directed:  Write down your questions so you remember to ask them during your visits  Manage syncope:   · Keep a record of your syncope episodes  Include your symptoms and your activity before and after the episode  The record can help your healthcare provider find the cause of your syncope and help you manage episodes  · Sit or lie down when needed  This includes when you feel dizzy, your throat is getting tight, and your vision changes  Raise your legs above the level of your heart  · Take slow, deep breaths if you start to breathe faster with anxiety or fear  This can help decrease dizziness and the feeling that you might faint  · Check your blood pressure often  This is important if you take medicine to lower your blood pressure   Check your blood pressure when you are lying down and when you are standing  Ask how often to check during the day  Keep a record of your blood pressure numbers  Your healthcare provider may use the record to help plan your treatment  Prevent a syncope episode:   · Move slowly and let yourself get used to one position before you move to another position  This is very important when you change from a lying or sitting position to a standing position  Take some deep breaths before you stand up from a lying position  Stand up slowly  Sudden movements may cause a fainting spell  Sit on the side of the bed or couch for a few minutes before you stand up  If you are on bedrest, try to be upright for about 2 hours each day, or as directed  Do not lock your legs if you are standing for a long period of time  Move your legs and bend your knees to keep blood flowing  · Follow your healthcare provider's recommendations  Your provider may  recommend that you drink more liquids to prevent dehydration  You may also need to have more salt to keep your blood pressure from dropping too low and causing syncope  Your provider will tell you how much liquid and sodium to have each day  · Watch for signs of low blood sugar  These include hunger, nervousness, sweating, and fast or fluttery heartbeats  Talk with your healthcare provider about ways to keep your blood sugar level steady  · Do not strain if you are constipated  You may faint if you strain to have a bowel movement  Walking is the best way to get your bowels moving  Eat foods high in fiber to make it easier to have a bowel movement  Good examples are high-fiber cereals, beans, vegetables, and whole-grain breads  Prune juice may help make bowel movements softer  · Be careful in hot weather  Heat can cause a syncope episode  Limit activity done outside on hot days  Physical activity in hot weather can lead to dehydration  This can cause an episode    © 2017 [employer] Information is for End User's use only and may not be sold, redistributed or otherwise used for commercial purposes  All illustrations and images included in CareNotes® are the copyrighted property of A D A M , Inc  or Tima Thomas  The above information is an  only  It is not intended as medical advice for individual conditions or treatments  Talk to your doctor, nurse or pharmacist before following any medical regimen to see if it is safe and effective for you

## 2018-06-29 NOTE — ED PROVIDER NOTES
History  Chief Complaint   Patient presents with    Syncope     Patient states she woke up on the floor after entering her house this morning after being outside smoking  Crawled to the [facility] for bottle of water,  woke up on the floor by stove  Had a third episode prior to arrival  States having lower pelvic pain that radiates into her back  Patient is a 58-year-old female  No cardiac history  She has been experiencing lower abdominal pain radiating to the back  Crampy pain  Patient reports that she experiences similar pain when she ovulated send when she has her menses  Menses is due in about 4 or 5 days  She was experiencing was cramping this morning  Subsequently she had a syncopal episode  She had probably 3 this morning  No injury  She did feel dizzy and passed out  She became diaphoretic  No chest pain or palpitations  No shortness of breath  No headache  No history of syncope  No history of kidney stones  Patient does not know if she is pregnant  Symptoms are moderately severe  No aggravating or relieving factors  There is no associated vaginal bleeding or discharge  No urinary complaints  No fever or chills  No nausea, vomiting or diarrhea  No constipation  No hematemesis, hematochezia or melena  Prior to Admission Medications   Prescriptions Last Dose Informant Patient Reported? Taking? albuterol (PROAIR HFA) 90 mcg/act inhaler   No Yes   Sig: Inhale 2 puffs every 6 (six) hours as needed for shortness of breath   levothyroxine 50 mcg tablet   No Yes   Sig: Take 1 tablet daily   triamcinolone (KENALOG) 0 1 % cream   Yes Yes   Sig: Apply topically 2 (two) times a day      Facility-Administered Medications: None       History reviewed  No pertinent past medical history  History reviewed  No pertinent surgical history      Family History   Problem Relation Age of Onset   Elwyn Serge Cancer Sister     Diabetes Paternal Grandfather      I have reviewed and agree with the history as documented  Social History   Substance Use Topics    Smoking status: Current Every Day Smoker     Types: Cigarettes    Smokeless tobacco: Never Used    Alcohol use No        Review of Systems   Constitutional: Negative for chills and fever  HENT: Negative for rhinorrhea and sore throat  Eyes: Negative for pain, redness and visual disturbance  Respiratory: Negative for cough and shortness of breath  Cardiovascular: Negative for chest pain and leg swelling  Gastrointestinal: Positive for abdominal pain  Negative for diarrhea and vomiting  Endocrine: Negative for polydipsia and polyuria  Genitourinary: Negative for dysuria, frequency, hematuria, vaginal bleeding and vaginal discharge  Musculoskeletal: Negative for back pain and neck pain  Skin: Negative for rash and wound  Allergic/Immunologic: Negative for immunocompromised state  Neurological: Positive for dizziness and syncope  Negative for weakness, numbness and headaches  Hematological: Does not bruise/bleed easily  Psychiatric/Behavioral: Negative for hallucinations and suicidal ideas  All other systems reviewed and are negative  Physical Exam  Physical Exam   Constitutional: She is oriented to person, place, and time  She appears well-developed and well-nourished  No distress  HENT:   Head: Normocephalic and atraumatic  Mouth/Throat: Oropharynx is clear and moist    Eyes: Conjunctivae and EOM are normal  Pupils are equal, round, and reactive to light  Right eye exhibits no discharge  Left eye exhibits no discharge  No scleral icterus  Neck: Normal range of motion  Neck supple  Cardiovascular: Normal rate, regular rhythm, normal heart sounds and intact distal pulses  Exam reveals no gallop and no friction rub  No murmur heard  Pulmonary/Chest: Effort normal and breath sounds normal  No stridor  No respiratory distress  She has no wheezes  She has no rales  Abdominal: Soft   Bowel sounds are normal  She exhibits no distension  There is no tenderness  There is no rebound and no guarding  Musculoskeletal: Normal range of motion  She exhibits no edema, tenderness or deformity  No CVA tenderness  No calf tenderness  Neurological: She is alert and oriented to person, place, and time  She has normal strength  No cranial nerve deficit or sensory deficit  GCS eye subscore is 4  GCS verbal subscore is 5  GCS motor subscore is 6  Skin: Skin is warm and dry  No rash noted  She is not diaphoretic  Psychiatric: She has a normal mood and affect  Her behavior is normal    Vitals reviewed  Vital Signs  ED Triage Vitals [06/29/18 0715]   Temperature Pulse Respirations Blood Pressure SpO2   (!) 97 4 °F (36 3 °C) 78 18 116/55 98 %      Temp Source Heart Rate Source Patient Position - Orthostatic VS BP Location FiO2 (%)   Temporal Monitor Lying Left arm --      Pain Score       4           Vitals:    06/29/18 0855 06/29/18 0939 06/29/18 0941 06/29/18 0943   BP: (!) 86/49 95/50 103/50 119/55   Pulse: 58 58 61 73   Patient Position - Orthostatic VS: Lying Lying Sitting - Orthostatic VS Standing - Orthostatic VS       Visual Acuity  Visual Acuity      Most Recent Value   L Pupil Size (mm)  3   R Pupil Size (mm)  3          ED Medications  Medications   sodium chloride 0 9 % bolus 1,000 mL (0 mL Intravenous Stopped 6/29/18 0821)   ketorolac (TORADOL) injection 30 mg (30 mg Intravenous Given 6/29/18 0729)   sodium chloride 0 9 % infusion (0 mL/hr Intravenous Stopped 6/29/18 0938)   sodium chloride 0 9 % infusion (0 mL/hr Intravenous Stopped 6/29/18 0938)       Diagnostic Studies  Results Reviewed     Procedure Component Value Units Date/Time    UA w Reflex to Microscopic [[id number]] Collected:  06/29/18 0954    Lab Status:   In process Specimen:  Urine from Urine, Clean Catch Updated:  06/29/18 0957    Singer draw [[id number]] Collected:  06/29/18 0730    Lab Status:  Final result Specimen:  Blood Updated:  06/29/18 6100 Narrative: The following orders were created for panel order [place] draw  Procedure                               Abnormality         Status                     ---------                               -----------         ------                     Pretty Hoffmann Top on MWAW[[id number]]                            Final result               Gold top on YORC[[id number]]                                  Final result               Lavender Top 7ml on ESKV[[id number]]                          Final result                 Please view results for these tests on the individual orders  POCT pregnancy, urine [[id number]]  (Normal) Resulted:  06/29/18 0816    Lab Status:  Final result Updated:  06/29/18 0816     EXT PREG TEST UR (Ref: Negative) Negative    Troponin I [[id number]]  (Normal) Collected:  06/29/18 0730    Lab Status:  Final result Specimen:  Blood from Arm, Right Updated:  06/29/18 0802     Troponin I <0 02 ng/mL     Comprehensive metabolic panel [[id number]] Collected:  06/29/18 0730    Lab Status:  Final result Specimen:  Blood from Arm, Right Updated:  06/29/18 0800     Sodium 141 mmol/L      Potassium 3 8 mmol/L      Chloride 103 mmol/L      CO2 27 mmol/L      Anion Gap 11 mmol/L      BUN 10 mg/dL      Creatinine 0 82 mg/dL      Glucose 102 mg/dL      Calcium 8 9 mg/dL      AST 15 U/L      ALT 26 U/L      Alkaline Phosphatase 81 U/L      Total Protein 7 5 g/dL      Albumin 3 8 g/dL      Total Bilirubin 0 60 mg/dL      eGFR 93 ml/min/1 73sq m     Narrative:         National Kidney Disease Education Program recommendations are as follows:  GFR calculation is accurate only with a steady state creatinine  Chronic Kidney disease less than 60 ml/min/1 73 sq  meters  Kidney failure less than 15 ml/min/1 73 sq  meters      Lipase [[id number]]  (Normal) Collected:  06/29/18 0730    Lab Status:  Final result Specimen:  Blood from Arm, Right Updated:  06/29/18 0800     Lipase 75 u/L     CBC and differential [[id number]]  (Normal) Collected:  06/29/18 0730    Lab Status:  Final result Specimen:  Blood from Arm, Right Updated:  06/29/18 0744     WBC 9 80 Thousand/uL      RBC 4 49 Million/uL      Hemoglobin 14 5 g/dL      Hematocrit 44 1 %      MCV 98 fL      MCH 32 3 pg      MCHC 32 9 g/dL      RDW 13 3 %      MPV 9 4 fL      Platelets 338 Thousands/uL      Neutrophils Relative 65 %      Lymphocytes Relative 25 %      Monocytes Relative 8 %      Eosinophils Relative 2 %      Basophils Relative 0 %      Neutrophils Absolute 6 39 Thousands/µL      Lymphocytes Absolute 2 42 Thousands/µL      Monocytes Absolute 0 80 Thousand/µL      Eosinophils Absolute 0 17 Thousand/µL      Basophils Absolute 0 02 Thousands/µL                  XR chest 2 views   ED Interpretation by Dorian Runner, MD (06/29 4920)   No infiltrates  No congestive heart failure  No cardiomegaly  No widened mediastinum  Final Result by Noy Bustos MD (06/29 9804)      No acute cardiopulmonary disease  Workstation performed: [id number]         CT renal stone study abdomen pelvis without contrast   Final Result by Phil Best MD (06/29 6962)      No urinary tract calculi  No hydronephrosis  Normal appendix  No acute CT abnormality to definitively account for the patient's symptoms               Workstation performed: [id number]                    Procedures  ECG 12 Lead Documentation  Date/Time: 6/29/2018 7:32 AM  Performed by: Herbert Figueroa by: Paolo Adjutant     ECG reviewed by me, the ED Provider: yes    Patient location:  ED  Interpretation:     Interpretation: normal    Rate:     ECG rate assessment: normal    Rhythm:     Rhythm: sinus rhythm    Ectopy:     Ectopy: none    QRS:     QRS axis:  Normal  Conduction:     Conduction: normal    ST segments:     ST segments:  Normal  T waves:     T waves: normal             Phone Contacts  ED Phone Contact    ED Course                               MDM  Number of Diagnoses or Management Options  Diagnosis management comments: CT scan was normal   Chest x-ray was normal  EKG was normal   Laboratory evaluation was normal  Patient did have transient hypotension in the emergency room  This resolved with IV fluids  Orthostatics are currently normal  Etiology of patient's syncope is unclear  However there is no evidence of any significant cardiac disease  No evidence of intra-abdominal pathology  Appropriate for discharge and outpatient management  Amount and/or Complexity of Data Reviewed  Clinical lab tests: ordered and reviewed  Tests in the radiology section of CPT®: ordered and reviewed  Independent visualization of images, tracings, or specimens: yes      CritCare Time    Disposition  Final diagnoses:   Syncope     Time reflects when diagnosis was documented in both MDM as applicable and the Disposition within this note     Time User Action Codes Description Comment    6/29/2018 10:13 AM Harika Hankins Add [R55] Syncope       ED Disposition     ED Disposition Condition Comment    Discharge  SUSHIL JORGENSEN discharge to home/self care  Condition at discharge: Good        Follow-up Information     Follow up With Specialties Details Why [address], MD Family Medicine In 3 days  [address]  [phone]            Patient's Medications   Discharge Prescriptions    No medications on file     No discharge procedures on file      ED Provider  Electronically Signed by           Donnie Bruno MD  06/29/18 1692

## 2018-06-29 NOTE — ED NOTES
Patient transported to [facility] via eron Magdaleno RN  06/29/18 7901

## 2018-06-30 LAB
ATRIAL RATE: 73 BPM
P AXIS: 76 DEGREES
PR INTERVAL: 176 MS
QRS AXIS: 73 DEGREES
QRSD INTERVAL: 80 MS
QT INTERVAL: 372 MS
QTC INTERVAL: 409 MS
T WAVE AXIS: 51 DEGREES
VENTRICULAR RATE: 73 BPM

## 2018-06-30 PROCEDURE — 93010 ELECTROCARDIOGRAM REPORT: CPT | Performed by: INTERNAL MEDICINE

## 2018-07-05 ENCOUNTER — OFFICE VISIT (OUTPATIENT)
Dept: INTERNAL MEDICINE CLINIC | Facility: CLINIC | Age: 33
End: 2018-07-05
Payer: COMMERCIAL

## 2018-07-05 VITALS
HEIGHT: 65 IN | WEIGHT: 166.7 LBS | BODY MASS INDEX: 27.77 KG/M2 | HEART RATE: 76 BPM | DIASTOLIC BLOOD PRESSURE: 60 MMHG | OXYGEN SATURATION: 98 % | TEMPERATURE: 97.5 F | SYSTOLIC BLOOD PRESSURE: 102 MMHG

## 2018-07-05 DIAGNOSIS — R55 VASO VAGAL EPISODE: ICD-10-CM

## 2018-07-05 DIAGNOSIS — R55 SYNCOPE AND COLLAPSE: Primary | ICD-10-CM

## 2018-07-05 DIAGNOSIS — N92.1 MENORRHAGIA WITH IRREGULAR CYCLE: ICD-10-CM

## 2018-07-05 PROBLEM — N92.0 MENORRHAGIA WITH REGULAR CYCLE: Status: ACTIVE | Noted: 2018-07-05

## 2018-07-05 PROBLEM — N92.0 MENORRHAGIA WITH REGULAR CYCLE: Status: RESOLVED | Noted: 2018-07-05 | Resolved: 2018-07-05

## 2018-07-05 PROCEDURE — 99214 OFFICE O/P EST MOD 30 MIN: CPT | Performed by: NURSE PRACTITIONER

## 2018-07-05 PROCEDURE — 3008F BODY MASS INDEX DOCD: CPT | Performed by: NURSE PRACTITIONER

## 2018-07-05 NOTE — PROGRESS NOTES
Assessment/Plan: I feel her symptoms are most likely consistent with Vaso Vagal   She was instructed to stay well hydrated, the risks of continuing smoking, and following up with her GYN  Will give her a script for wrist BP cuff and she was advised to check this if she feels lightheaded or dizzy  She was advised when getting up from a sitting or lying position to get up slowly  I do not feel at this time she needs any other work up cardiac wise  Will follow up as needed  No problem-specific Assessment & Plan notes found for this encounter  Problem List Items Addressed This Visit     Syncope and collapse - Primary    Relevant Medications    Blood Pressure Monitoring (BLOOD PRESSURE MONITOR/WRIST) KIT    Menorrhagia with irregular cycle            Subjective:      Patient ID: Hung Baez is a 26 y o  female  Renuka Storm is here today for an ER follow up visit  She was seen in the ER on 6/29 after having a syncopal episode at home  She did wake up on the floor after entering her house this morning after being outside smoking  She did crawl to the kitchen for a bottle of water, woke up on the floor by stove  Had a third episode prior to arrival to the ER  She did have a full work up done with CT scan and EKG which was all normal   She states shortly before waking up on the floor she was having sharp lower abdominal pain and she feels this is what triggered her symptoms  She denies any chest pain, SOB, or palpitations  She denies any depression but does have some anxiety  She does smoke around 1/2 PPD  She states she was not overly stressed about anything when this happened and was outside smoking  She states she did become nauseated and felt off  She states in the ER her BP was low and she was very worried about that  She states she is due to get her menses  She does have irregular flows and at times they are very heavy and she dos having cramping  She is making an appointment with GYN  She offers no other complaints  The following portions of the patient's history were reviewed and updated as appropriate:   She  has no past medical history on file  She   Patient Active Problem List    Diagnosis Date Noted    Syncope and collapse 07/05/2018    Menorrhagia with irregular cycle 07/05/2018    Chapped lips 11/20/2017    Allergic rhinitis 05/11/2017    Asthma 05/11/2017    Eczema 04/07/2017    Hypothyroidism 10/12/2016    Mild intermittent asthma without complication 22/55/8163     She  has no past surgical history on file  Her family history includes Cancer in her sister; Diabetes in her paternal grandfather  She  reports that she has been smoking Cigarettes  She has never used smokeless tobacco  She reports that she does not drink alcohol or use drugs  Current Outpatient Prescriptions   Medication Sig Dispense Refill    albuterol (PROAIR HFA) 90 mcg/act inhaler Inhale 2 puffs every 6 (six) hours as needed for shortness of breath 2 Inhaler 3    levothyroxine 50 mcg tablet Take 1 tablet daily 30 tablet 5    triamcinolone (KENALOG) 0 1 % cream Apply topically 2 (two) times a day      Blood Pressure Monitoring (BLOOD PRESSURE MONITOR/WRIST) KIT 1 Package by Other route 2 (two) times a day 1 kit 0     No current facility-administered medications for this visit  Current Outpatient Prescriptions on File Prior to Visit   Medication Sig    albuterol (PROAIR HFA) 90 mcg/act inhaler Inhale 2 puffs every 6 (six) hours as needed for shortness of breath    levothyroxine 50 mcg tablet Take 1 tablet daily    triamcinolone (KENALOG) 0 1 % cream Apply topically 2 (two) times a day     No current facility-administered medications on file prior to visit  She is allergic to cat hair extract; dog epithelium; nuts; and pollen extract       Review of Systems   All other systems reviewed and are negative          Objective:      /60 (BP Location: Right arm, Patient Position: Sitting, Cuff Size: Adult)   Pulse 76   Temp 97 5 °F (36 4 °C) (Temporal)   Ht 5' 5" (1 651 m)   Wt 75 6 kg (166 lb 11 2 oz)   SpO2 98%   BMI 27 74 kg/m²          Physical Exam   Constitutional: She is oriented to person, place, and time  She appears well-developed and well-nourished  HENT:   Head: Normocephalic and atraumatic  Right Ear: External ear normal    Left Ear: External ear normal    Nose: Nose normal    Mouth/Throat: Oropharynx is clear and moist    Eyes: Conjunctivae and EOM are normal  Pupils are equal, round, and reactive to light  Neck: Normal range of motion  Neck supple  Cardiovascular: Normal rate, regular rhythm, normal heart sounds and intact distal pulses  Pulmonary/Chest: Effort normal and breath sounds normal    Abdominal: Soft  Bowel sounds are normal    Musculoskeletal: Normal range of motion  Neurological: She is alert and oriented to person, place, and time  She has normal reflexes  Skin: Skin is warm and dry  Psychiatric: She has a normal mood and affect  Her behavior is normal  Judgment and thought content normal    Vitals reviewed

## 2018-07-05 NOTE — PATIENT INSTRUCTIONS
Syncope   WHAT YOU NEED TO KNOW:   What is syncope? Syncope is also called fainting or passing out  Syncope is a sudden, temporary loss of consciousness, followed by a fall from a standing or sitting position  A syncope episode is usually short  What causes syncope? Syncope is caused by a decrease in blood flow to the brain  When blood flow to the brain decreases, oxygen to the brain also decreases  Any of the following conditions may cause syncope:  · A heart condition, such as a narrow artery or an irregular heartbeat    · A medical condition such as severe anemia, uncontrolled diabetes, or a nerve disorder    · Dehydration    · Certain medicines, such as blood pressure medicines, heart medicines, or antidepressants    · Problems with the blood vessels of your brain    · A rapid drop in blood pressure after a body position change, such as moving from lying to sitting or standing    · Straining during bowel movements, a cough or sneeze, or a stressful or fearful situation    · A medical condition that affects your lungs, such as pneumonia or asthma, or hyperventilation (breathing too quickly)  What signs and symptoms may occur before syncope? · Cold, clammy, and sweaty skin     · Fast breathing and a racing, pounding heartbeat     · Feeling more tired than usual     · Nausea, a warm feeling, and sweating    · A headache, or feeling lightheaded or dizzy    · Tingling sensation or numbness     · Spots in front of your eyes, blurred vision, or double vision  How is the cause of syncope diagnosed? Your healthcare provider will examine you  He or she will ask if you have other medical conditions  He or she may order the following tests to find out what is causing your symptoms:  · Blood tests  may be done to check your electrolyte levels, such as sodium  A problem with your electrolytes can lead to syncope  · Telemetry  is continuous monitoring of your heart rhythm   Sticky pads placed on your skin connect to an EKG machine that records your heart rhythm  · An echocardiogram  is a type of ultrasound  Sound waves are used to show the structure and function of your heart  · A stress test  may show the changes that take place in your heart while it is under stress  Stress may be placed on your heart with exercise or medicine  Ask for more information about this test     · A tilt table test  is used to check your heart and your blood pressure when you change positions  You will lie on a table during this test  The table will move in different positions  The strength and rhythm of your heartbeat will be measured as the table moves  How is syncope treated? Treatment depends on the cause of your syncope  To prevent syncope from happening again, you may  need any of the following:  · Medicines  may be needed to help your heart pump strongly and regularly  Your healthcare provider may also make changes to any medicines that are causing syncope  · Tilt training  involves training yourself to stand for 10 to 30 minutes each day against a wall  This helps your body decrease the effects of posture changes and reduces the number of fainting spells  What can I do to manage syncope? · Keep a record of your syncope episodes  Include your symptoms and your activity before and after the episode  The record can help your healthcare provider find the cause of your syncope and help you manage episodes  · Sit or lie down when needed  This includes when you feel dizzy, your throat is getting tight, and your vision changes  Raise your legs above the level of your heart  · Take slow, deep breaths if you start to breathe faster with anxiety or fear  This can help decrease dizziness and the feeling that you might faint  · Check your blood pressure often  This is important if you take medicine to lower your blood pressure  Check your blood pressure when you are lying down and when you are standing   Ask how often to check during the day  Keep a record of your blood pressure numbers  Your healthcare provider may use the record to help plan your treatment  What can I do to prevent a syncope episode? · Move slowly and let yourself get used to one position before you move to another position  This is very important when you change from a lying or sitting position to a standing position  Take some deep breaths before you stand up from a lying position  Stand up slowly  Sudden movements may cause a fainting spell  Sit on the side of the bed or couch for a few minutes before you stand up  If you are on bedrest, try to be upright for about 2 hours each day, or as directed  Do not lock your legs if you are standing for a long period of time  Move your legs and bend your knees to keep blood flowing  · Follow your healthcare provider's recommendations  Your provider may  recommend that you drink more liquids to prevent dehydration  You may also need to have more salt to keep your blood pressure from dropping too low and causing syncope  Your provider will tell you how much liquid and sodium to have each day  · Watch for signs of low blood sugar  These include hunger, nervousness, sweating, and fast or fluttery heartbeats  Talk with your healthcare provider about ways to keep your blood sugar level steady  · Do not strain if you are constipated  You may faint if you strain to have a bowel movement  Walking is the best way to get your bowels moving  Eat foods high in fiber to make it easier to have a bowel movement  Good examples are high-fiber cereals, beans, vegetables, and whole-grain breads  Prune juice may help make bowel movements softer  · Be careful in hot weather  Heat can cause a syncope episode  Limit activity done outside on hot days  Physical activity in hot weather can lead to dehydration  This can cause an episode  When should I seek immediate care?    · You are bleeding because you hit your head when you fainted  · You suddenly have double vision, difficulty speaking, numbness, and cannot move your arms or legs  · You have chest pain and trouble breathing  · You vomit blood or material that looks like coffee grounds  · You see blood in your bowel movement  When should I contact my healthcare provider? · You have new or worsening symptoms  · You have another syncope episode  · You have a headache, fast heartbeat, or feel too dizzy to stand up  · You have questions or concerns about your condition or care  CARE AGREEMENT:   You have the right to help plan your care  Learn about your health condition and how it may be treated  Discuss treatment options with your caregivers to decide what care you want to receive  You always have the right to refuse treatment  The above information is an  only  It is not intended as medical advice for individual conditions or treatments  Talk to your doctor, nurse or pharmacist before following any medical regimen to see if it is safe and effective for you  © 2017 [employer] Information is for End User's use only and may not be sold, redistributed or otherwise used for commercial purposes  All illustrations and images included in CareNotes® are the copyrighted property of [employer] M [employer]  or Tima Thomas

## 2018-09-26 ENCOUNTER — TELEPHONE (OUTPATIENT)
Dept: INTERNAL MEDICINE CLINIC | Facility: CLINIC | Age: 33
End: 2018-09-26

## 2018-09-26 DIAGNOSIS — J06.9 ACUTE UPPER RESPIRATORY INFECTION: Primary | ICD-10-CM

## 2018-09-26 RX ORDER — AMOXICILLIN 500 MG/1
500 CAPSULE ORAL EVERY 8 HOURS SCHEDULED
Qty: 30 CAPSULE | Refills: 0 | Status: SHIPPED | OUTPATIENT
Start: 2018-09-26 | End: 2018-10-06

## 2018-09-26 RX ORDER — BENZONATATE 200 MG/1
200 CAPSULE ORAL 3 TIMES DAILY PRN
Qty: 20 CAPSULE | Refills: 0 | Status: SHIPPED | OUTPATIENT
Start: 2018-09-26 | End: 2018-10-29 | Stop reason: DRUGHIGH

## 2018-09-26 NOTE — TELEPHONE ENCOUNTER
Yes, that is what she would prefer  [facility] in [facility]

## 2018-09-26 NOTE — TELEPHONE ENCOUNTER
Patient is not breastfeeding and probably would be good to have something for cough also since she is coughing a lot and wheezing and using her inhaler

## 2018-09-26 NOTE — TELEPHONE ENCOUNTER
Patient lmom and asked if Trisha Walter could call something in for her  She has a cough with yellow mucous, runny nose, headache and some wheezing  It started over the weekend  States that she was unable to come in sooner because son, Irineo Curling, has hand, foot, mouth and she was home with him, now needs to go to work today  Could come after 4:30 today if you need to see her  If calling something in, she uses [facility] in [place]  Also wanted to let you know that she read about son's hand, foot and mouth and realized not much could be done for it so she is just letting it run it's course

## 2018-10-01 DIAGNOSIS — R05.9 COUGH: Primary | ICD-10-CM

## 2018-10-01 RX ORDER — PROMETHAZINE HYDROCHLORIDE AND CODEINE PHOSPHATE 6.25; 1 MG/5ML; MG/5ML
5 SYRUP ORAL EVERY 4 HOURS PRN
Qty: 240 ML | Refills: 0 | Status: SHIPPED | OUTPATIENT
Start: 2018-10-01 | End: 2018-10-29 | Stop reason: DRUGHIGH

## 2018-10-29 ENCOUNTER — OFFICE VISIT (OUTPATIENT)
Dept: INTERNAL MEDICINE CLINIC | Facility: CLINIC | Age: 33
End: 2018-10-29
Payer: COMMERCIAL

## 2018-10-29 VITALS
SYSTOLIC BLOOD PRESSURE: 120 MMHG | TEMPERATURE: 98.9 F | OXYGEN SATURATION: 98 % | BODY MASS INDEX: 27.16 KG/M2 | HEIGHT: 65 IN | WEIGHT: 163 LBS | HEART RATE: 90 BPM | DIASTOLIC BLOOD PRESSURE: 82 MMHG

## 2018-10-29 DIAGNOSIS — L30.9 ECZEMA, UNSPECIFIED TYPE: ICD-10-CM

## 2018-10-29 DIAGNOSIS — F41.0 PANIC ATTACKS: ICD-10-CM

## 2018-10-29 DIAGNOSIS — F41.9 ANXIETY: Primary | ICD-10-CM

## 2018-10-29 DIAGNOSIS — J30.1 SEASONAL ALLERGIC RHINITIS DUE TO POLLEN: ICD-10-CM

## 2018-10-29 PROCEDURE — 99214 OFFICE O/P EST MOD 30 MIN: CPT | Performed by: NURSE PRACTITIONER

## 2018-10-29 RX ORDER — LORATADINE 10 MG/1
10 TABLET ORAL DAILY
Qty: 30 TABLET | Refills: 3 | Status: SHIPPED | OUTPATIENT
Start: 2018-10-29 | End: 2020-05-29

## 2018-10-29 RX ORDER — ESCITALOPRAM OXALATE 10 MG/1
10 TABLET ORAL DAILY
Qty: 30 TABLET | Refills: 3 | Status: SHIPPED | OUTPATIENT
Start: 2018-10-29 | End: 2019-02-22 | Stop reason: SDUPTHER

## 2018-10-29 RX ORDER — BETAMETHASONE DIPROPIONATE 0.5 MG/G
CREAM TOPICAL 2 TIMES DAILY
Qty: 45 G | Refills: 3 | Status: SHIPPED | OUTPATIENT
Start: 2018-10-29

## 2018-10-29 RX ORDER — ALPRAZOLAM 0.25 MG/1
0.25 TABLET ORAL
Qty: 30 TABLET | Refills: 0 | Status: SHIPPED | OUTPATIENT
Start: 2018-10-29 | End: 2020-08-24 | Stop reason: SDUPTHER

## 2018-10-29 NOTE — PROGRESS NOTES
Assessment/Plan: Will start the patient on Lexapro 10 mg daily and Xanax 0 25 mg by mouth once daily for anxiety  She does contract for safety today  She was advised I will only be giving her the Xanax short term for one month and not long term  She did comply with this  She was advised if continued headaches to call the office  Inquiry is consistent with prescribing history  Will bring her back in one month or sooner if need be  Will call in Claritin for her daily for her seasonal allergies  She was advised to side effects of this medications in the office  No problem-specific Assessment & Plan notes found for this encounter  Problem List Items Addressed This Visit     Allergic rhinitis    Relevant Medications    loratadine (CLARITIN) 10 mg tablet    Eczema    Relevant Medications    betamethasone dipropionate (DIPROSONE) 0 05 % cream    Anxiety - Primary    Relevant Medications    escitalopram (LEXAPRO) 10 mg tablet    ALPRAZolam (XANAX) 0 25 mg tablet    Panic attacks    Relevant Medications    ALPRAZolam (XANAX) 0 25 mg tablet            Subjective:      Patient ID: Moraima Locke is a 26 y o  female  Becca Horton is here today for an acute visit  She states she for the past several months has been having ongoing anxiety and panic attacks from her job  She does work at [employer] and states she is lashing out at [employer] and feels very overwhelmed  She denies any suicidal ideations  She states she at times has racing thoughts, issues with sleep, and is having issues with her  at home due to her ongoing anxiety  She denies any chest pain, SOB, or palpitations  She states she is in the process of looking for an new job at her sons   She also is having headaches with nausea and this just started over the past month  She feels this is related to her stress  She denies any sensitivity to light, loss or blurred vision    She states she is cutting back on drinking diet ice tea as well  She is having irregular periods and has not yet see the GYN  She states she feels all this is related to her stress  She also states anxiety does run in her family  She is also have dry eye and some redness and was taking OTC medications but did stop but would like something else called in for her  She offers no other issues  The following portions of the patient's history were reviewed and updated as appropriate:   She  has no past medical history on file  She   Patient Active Problem List    Diagnosis Date Noted    Anxiety 10/29/2018    Panic attacks 10/29/2018    Syncope and collapse 07/05/2018    Menorrhagia with irregular cycle 07/05/2018    Vaso vagal episode 07/05/2018    Chapped lips 11/20/2017    Allergic rhinitis 05/11/2017    Asthma 05/11/2017    Eczema 04/07/2017    Hypothyroidism 10/12/2016    Mild intermittent asthma without complication 80/91/0117     She  has no past surgical history on file  Her family history includes Cancer in her sister; Diabetes in her paternal grandfather  She  reports that she has been smoking Cigarettes  She has never used smokeless tobacco  She reports that she does not drink alcohol or use drugs    Current Outpatient Prescriptions   Medication Sig Dispense Refill    albuterol (PROAIR HFA) 90 mcg/act inhaler Inhale 2 puffs every 6 (six) hours as needed for shortness of breath 2 Inhaler 3    ALPRAZolam (XANAX) 0 25 mg tablet Take 1 tablet (0 25 mg total) by mouth daily at bedtime as needed for anxiety 30 tablet 0    betamethasone dipropionate (DIPROSONE) 0 05 % cream Apply topically 2 (two) times a day 45 g 3    Blood Pressure Monitoring (BLOOD PRESSURE MONITOR/WRIST) KIT 1 Package by Other route 2 (two) times a day 1 kit 0    escitalopram (LEXAPRO) 10 mg tablet Take 1 tablet (10 mg total) by mouth daily 30 tablet 3    levothyroxine 50 mcg tablet Take 1 tablet daily 30 tablet 5    loratadine (CLARITIN) 10 mg tablet Take 1 tablet (10 mg total) by mouth daily 30 tablet 3    triamcinolone (KENALOG) 0 1 % cream Apply topically 2 (two) times a day       No current facility-administered medications for this visit  Current Outpatient Prescriptions on File Prior to Visit   Medication Sig    albuterol (PROAIR HFA) 90 mcg/act inhaler Inhale 2 puffs every 6 (six) hours as needed for shortness of breath    Blood Pressure Monitoring (BLOOD PRESSURE MONITOR/WRIST) KIT 1 Package by Other route 2 (two) times a day    levothyroxine 50 mcg tablet Take 1 tablet daily    triamcinolone (KENALOG) 0 1 % cream Apply topically 2 (two) times a day    [DISCONTINUED] benzonatate (TESSALON) 200 MG capsule Take 1 capsule (200 mg total) by mouth 3 (three) times a day as needed for cough    [DISCONTINUED] promethazine-codeine (PHENERGAN WITH CODEINE) 6 25-10 mg/5 mL syrup Take 5 mL by mouth every 4 (four) hours as needed for cough     No current facility-administered medications on file prior to visit  She is allergic to cat hair extract; dog epithelium; nuts; and pollen extract       Review of Systems   Constitutional: Negative  HENT: Negative  Eyes: Negative  Respiratory: Negative  Cardiovascular: Negative  Gastrointestinal: Negative  Endocrine: Negative  Genitourinary: Negative  Musculoskeletal: Negative  Skin: Negative  Allergic/Immunologic: Negative  Neurological: Positive for headaches  Hematological: Negative  Psychiatric/Behavioral: The patient is nervous/anxious  Objective:      /82 (BP Location: Right arm, Patient Position: Sitting, Cuff Size: Adult)   Pulse 90   Temp 98 9 °F (37 2 °C) (Temporal)   Ht 5' 5" (1 651 m)   Wt 73 9 kg (163 lb)   SpO2 98%   BMI 27 12 kg/m²          Physical Exam   Constitutional: She is oriented to person, place, and time  She appears well-developed and well-nourished  HENT:   Head: Normocephalic and atraumatic     Right Ear: External ear normal    Left Ear: External ear normal    Nose: Nose normal    Mouth/Throat: Oropharynx is clear and moist    Eyes: Pupils are equal, round, and reactive to light  Conjunctivae and EOM are normal    Neck: Normal range of motion  Neck supple  Cardiovascular: Normal rate, regular rhythm, normal heart sounds and intact distal pulses  Pulmonary/Chest: Effort normal and breath sounds normal    Abdominal: Soft  Bowel sounds are normal    Musculoskeletal: Normal range of motion  Neurological: She is alert and oriented to person, place, and time  She has normal reflexes  Skin: Skin is warm and dry  Psychiatric: She has a normal mood and affect  Her behavior is normal  Judgment and thought content normal    Vitals reviewed

## 2018-10-29 NOTE — LETTER
October 29, 2018     Patient: Kristel Simon   YOB: 1985   Date of Visit: 10/29/2018       To Whom it May Concern:    Kristel Simon is under my professional care  She was seen in my office on 10/29/2018  Please excuse Manisha Castillo from traveling to [place] for training on 10/30/18 and 10/31/18  If you have any questions or concerns, please don't hesitate to call           Sincerely,              Wendie Ybarra

## 2018-10-29 NOTE — PATIENT INSTRUCTIONS
Anxiety   WHAT YOU NEED TO KNOW:   What do I need to know about anxiety? Anxiety is a condition that causes you to feel extremely worried or nervous  The feelings are so strong that they can cause problems with your daily activities or sleep  Anxiety may be triggered by something you fear, or it may happen without a cause  Family or work stress, smoking, caffeine, and alcohol can increase your risk for anxiety  Certain medicines or health conditions can also increase your risk  Anxiety can become a long-term condition if it is not managed or treated  What other common signs and symptoms may occur with anxiety? · Fatigue or muscle tightness     · Shaking, restlessness, or irritability     · Problems focusing     · Trouble sleeping     · Feeling jumpy, easily startled, or dizzy     · Rapid heartbeat or shortness of breath  What do I need to tell my healthcare provider about my anxiety? Tell your healthcare provider when your symptoms began and what triggers them  Tell your provider if anxiety affects your daily activities  Your provider will also ask about your medical history and if you have family members with a similar condition  Tell your provider about your past and present alcohol, nicotine, or drug use  What can I do to manage anxiety? You may get medicines to help you feel calm and relaxed, and to decrease your symptoms  Medicines are usually given together with therapy or other treatments  The following can help you manage anxiety:  · Talk to someone about your anxiety  Your healthcare provider may suggest counseling  Cognitive behavioral therapy can help you understand and change how you react to events that trigger your symptoms  You might feel more comfortable talking with a friend or family member about your anxiety  Choose someone you know will be supportive and encouraging  · Find ways to relax  Activities such as exercise, meditation, or listening to music can help you relax   Spend time with friends, or do things you enjoy  · Practice deep breathing  Deep breathing can help you relax when you feel anxious  Focus on taking slow, deep breaths several times a day, or during an anxiety attack  Breathe in through your nose and out through your mouth  · Create a regular sleep routine  Regular sleep can help you feel calmer during the day  Go to sleep and wake up at the same times every day  Do not watch television or use the computer right before bed  Your room should be comfortable, dark, and quiet  · Eat a variety of healthy foods  Healthy foods include fruits, vegetables, low-fat dairy products, lean meats, fish, whole-grain breads, and cooked beans  Healthy foods can help you feel less anxious and have more energy  · Exercise regularly  Exercise can increase your energy level  Exercise may also lift your mood and help you sleep better  Your healthcare provider can help you create an exercise plan  · Do not smoke  Nicotine and other chemicals in cigarettes and cigars can increase anxiety  Ask your healthcare provider for information if you currently smoke and need help to quit  E-cigarettes or smokeless tobacco still contain nicotine  Talk to your healthcare provider before you use these products  · Do not have caffeine  Caffeine can make your symptoms worse  Do not have foods or drinks that are meant to increase your energy level  · Limit or do not drink alcohol  Ask your healthcare provider if alcohol is safe for you  You may not be able to drink alcohol if you take certain anxiety or depression medicines  Limit alcohol to 1 drink per day if you are a woman  Limit alcohol to 2 drinks per day if you are a man  A drink of alcohol is 12 ounces of beer, 5 ounces of wine, or 1½ ounces of liquor  · Do not use drugs  Drugs can make your anxiety worse  It can also make anxiety hard to manage  Talk to your healthcare provider if you use drugs and want help to quit    Call 911 if:   · You have chest pain, tightness, or heaviness that may spread to your shoulders, arms, jaw, neck, or back  · You feel like hurting yourself or someone else  When should I contact my healthcare provider? · Your symptoms get worse or do not get better with treatment  · Your anxiety keeps you from doing your regular daily activities  · You have new symptoms since your last visit  · You have questions or concerns about your condition or care  CARE AGREEMENT:   You have the right to help plan your care  Learn about your health condition and how it may be treated  Discuss treatment options with your caregivers to decide what care you want to receive  You always have the right to refuse treatment  The above information is an  only  It is not intended as medical advice for individual conditions or treatments  Talk to your doctor, nurse or pharmacist before following any medical regimen to see if it is safe and effective for you  © 2017 [employer] Information is for End User's use only and may not be sold, redistributed or otherwise used for commercial purposes  All illustrations and images included in CareNotes® are the copyrighted property of A [employer] M [employer] Inc  or Tima Thomas

## 2018-11-28 ENCOUNTER — OFFICE VISIT (OUTPATIENT)
Dept: INTERNAL MEDICINE CLINIC | Facility: CLINIC | Age: 33
End: 2018-11-28
Payer: COMMERCIAL

## 2018-11-28 VITALS
OXYGEN SATURATION: 97 % | HEIGHT: 65 IN | BODY MASS INDEX: 26.94 KG/M2 | WEIGHT: 161.7 LBS | SYSTOLIC BLOOD PRESSURE: 102 MMHG | TEMPERATURE: 99.5 F | DIASTOLIC BLOOD PRESSURE: 70 MMHG | HEART RATE: 78 BPM

## 2018-11-28 DIAGNOSIS — F41.0 PANIC ATTACKS: Primary | ICD-10-CM

## 2018-11-28 PROCEDURE — 99213 OFFICE O/P EST LOW 20 MIN: CPT | Performed by: NURSE PRACTITIONER

## 2018-11-28 PROCEDURE — 3008F BODY MASS INDEX DOCD: CPT | Performed by: NURSE PRACTITIONER

## 2018-11-28 NOTE — PROGRESS NOTES
Assessment/Plan: Patient to continue Lexapro and Xanax as needed  She does contract for safety  Will follow up as needed or sooner if need be  No problem-specific Assessment & Plan notes found for this encounter  Problem List Items Addressed This Visit     Panic attacks - Primary            Subjective:      Patient ID: Soheila Concepcion is a 26 y o  female  Khloe Oropeza is here today for a follow up visit  She states since starting the Lexapro she is feeling much better and has only taken her Xanax times 3 doses  She states work is getting much better and is very glad she did start this  She offers no other complaints  The following portions of the patient's history were reviewed and updated as appropriate:   She  has no past medical history on file  She   Patient Active Problem List    Diagnosis Date Noted    Anxiety 10/29/2018    Panic attacks 10/29/2018    Syncope and collapse 07/05/2018    Menorrhagia with irregular cycle 07/05/2018    Vaso vagal episode 07/05/2018    Chapped lips 11/20/2017    Allergic rhinitis 05/11/2017    Asthma 05/11/2017    Eczema 04/07/2017    Hypothyroidism 10/12/2016    Mild intermittent asthma without complication 82/25/0176     She  has no past surgical history on file  Her family history includes Cancer in her sister; Diabetes in her paternal grandfather  She  reports that she has been smoking Cigarettes  She has never used smokeless tobacco  She reports that she does not drink alcohol or use drugs    Current Outpatient Prescriptions   Medication Sig Dispense Refill    albuterol (PROAIR HFA) 90 mcg/act inhaler Inhale 2 puffs every 6 (six) hours as needed for shortness of breath 2 Inhaler 3    ALPRAZolam (XANAX) 0 25 mg tablet Take 1 tablet (0 25 mg total) by mouth daily at bedtime as needed for anxiety 30 tablet 0    betamethasone dipropionate (DIPROSONE) 0 05 % cream Apply topically 2 (two) times a day 45 g 3    Blood Pressure Monitoring (BLOOD PRESSURE MONITOR/WRIST) KIT 1 Package by Other route 2 (two) times a day 1 kit 0    escitalopram (LEXAPRO) 10 mg tablet Take 1 tablet (10 mg total) by mouth daily 30 tablet 3    levothyroxine 50 mcg tablet Take 1 tablet daily 30 tablet 5    loratadine (CLARITIN) 10 mg tablet Take 1 tablet (10 mg total) by mouth daily 30 tablet 3    triamcinolone (KENALOG) 0 1 % cream Apply topically 2 (two) times a day       No current facility-administered medications for this visit  Current Outpatient Prescriptions on File Prior to Visit   Medication Sig    albuterol (PROAIR HFA) 90 mcg/act inhaler Inhale 2 puffs every 6 (six) hours as needed for shortness of breath    ALPRAZolam (XANAX) 0 25 mg tablet Take 1 tablet (0 25 mg total) by mouth daily at bedtime as needed for anxiety    betamethasone dipropionate (DIPROSONE) 0 05 % cream Apply topically 2 (two) times a day    Blood Pressure Monitoring (BLOOD PRESSURE MONITOR/WRIST) KIT 1 Package by Other route 2 (two) times a day    escitalopram (LEXAPRO) 10 mg tablet Take 1 tablet (10 mg total) by mouth daily    levothyroxine 50 mcg tablet Take 1 tablet daily    loratadine (CLARITIN) 10 mg tablet Take 1 tablet (10 mg total) by mouth daily    triamcinolone (KENALOG) 0 1 % cream Apply topically 2 (two) times a day     No current facility-administered medications on file prior to visit  She is allergic to cat hair extract; dog epithelium; nuts; and pollen extract       Review of Systems   All other systems reviewed and are negative  Objective:      /70 (BP Location: Right arm, Patient Position: Sitting, Cuff Size: Adult)   Pulse 78   Temp 99 5 °F (37 5 °C) (Temporal)   Ht 5' 5" (1 651 m)   Wt 73 3 kg (161 lb 11 2 oz)   SpO2 97%   BMI 26 91 kg/m²          Physical Exam   Constitutional: She is oriented to person, place, and time  She appears well-developed and well-nourished     Cardiovascular: Normal rate, regular rhythm, normal heart sounds and intact distal pulses  Pulmonary/Chest: Effort normal and breath sounds normal    Neurological: She is alert and oriented to person, place, and time  She has normal reflexes  Skin: Skin is warm and dry  Psychiatric: She has a normal mood and affect  Her behavior is normal  Judgment and thought content normal    Vitals reviewed

## 2019-01-30 ENCOUNTER — TELEPHONE (OUTPATIENT)
Dept: INTERNAL MEDICINE CLINIC | Facility: CLINIC | Age: 34
End: 2019-01-30

## 2019-01-30 DIAGNOSIS — J00 ACUTE NASOPHARYNGITIS: Primary | ICD-10-CM

## 2019-01-30 RX ORDER — AMOXICILLIN 875 MG/1
875 TABLET, COATED ORAL 2 TIMES DAILY
Qty: 20 TABLET | Refills: 0 | Status: SHIPPED | OUTPATIENT
Start: 2019-01-30 | End: 2019-02-09

## 2019-01-30 NOTE — TELEPHONE ENCOUNTER
Message left on answering machine  Stated she has sore throat R ear blocked  And sinus pressure  Feels she is passing it back and forth from her to her son  She was looking for something to be called in  Stated on and off for a couple of weeks    [phone]

## 2019-02-22 DIAGNOSIS — F41.9 ANXIETY: ICD-10-CM

## 2019-02-22 RX ORDER — ESCITALOPRAM OXALATE 10 MG/1
TABLET ORAL
Qty: 30 TABLET | Refills: 3 | Status: SHIPPED | OUTPATIENT
Start: 2019-02-22 | End: 2019-07-24 | Stop reason: SDUPTHER

## 2019-06-13 DIAGNOSIS — E03.9 HYPOTHYROIDISM, UNSPECIFIED TYPE: ICD-10-CM

## 2019-06-13 RX ORDER — LEVOTHYROXINE SODIUM 0.05 MG/1
TABLET ORAL
Qty: 30 TABLET | Refills: 5 | OUTPATIENT
Start: 2019-06-13

## 2019-07-01 DIAGNOSIS — E03.9 HYPOTHYROIDISM, UNSPECIFIED TYPE: ICD-10-CM

## 2019-07-01 RX ORDER — LEVOTHYROXINE SODIUM 0.05 MG/1
TABLET ORAL
Qty: 30 TABLET | Refills: 5 | OUTPATIENT
Start: 2019-07-01

## 2019-07-02 DIAGNOSIS — E03.9 HYPOTHYROIDISM, UNSPECIFIED TYPE: ICD-10-CM

## 2019-07-02 RX ORDER — LEVOTHYROXINE SODIUM 0.05 MG/1
50 TABLET ORAL DAILY
Qty: 30 TABLET | Refills: 5 | Status: SHIPPED | OUTPATIENT
Start: 2019-07-02 | End: 2020-01-23 | Stop reason: SDUPTHER

## 2019-07-24 DIAGNOSIS — F41.9 ANXIETY: ICD-10-CM

## 2019-07-24 RX ORDER — ESCITALOPRAM OXALATE 10 MG/1
TABLET ORAL
Qty: 30 TABLET | Refills: 3 | Status: SHIPPED | OUTPATIENT
Start: 2019-07-24 | End: 2019-12-11 | Stop reason: SDUPTHER

## 2019-10-08 ENCOUNTER — OFFICE VISIT (OUTPATIENT)
Dept: INTERNAL MEDICINE CLINIC | Facility: CLINIC | Age: 34
End: 2019-10-08
Payer: COMMERCIAL

## 2019-10-08 VITALS
WEIGHT: 177.1 LBS | SYSTOLIC BLOOD PRESSURE: 116 MMHG | HEART RATE: 71 BPM | BODY MASS INDEX: 29.51 KG/M2 | TEMPERATURE: 97.4 F | DIASTOLIC BLOOD PRESSURE: 70 MMHG | RESPIRATION RATE: 14 BRPM | HEIGHT: 65 IN | OXYGEN SATURATION: 99 %

## 2019-10-08 DIAGNOSIS — R53.83 FATIGUE, UNSPECIFIED TYPE: ICD-10-CM

## 2019-10-08 DIAGNOSIS — R51.9 FREQUENT HEADACHES: ICD-10-CM

## 2019-10-08 DIAGNOSIS — Z13.21 ENCOUNTER FOR VITAMIN DEFICIENCY SCREENING: ICD-10-CM

## 2019-10-08 DIAGNOSIS — E03.9 HYPOTHYROIDISM, UNSPECIFIED TYPE: Primary | ICD-10-CM

## 2019-10-08 DIAGNOSIS — Z13.6 SCREENING FOR CARDIOVASCULAR CONDITION: ICD-10-CM

## 2019-10-08 PROBLEM — R55 SYNCOPE AND COLLAPSE: Status: RESOLVED | Noted: 2018-07-05 | Resolved: 2019-10-08

## 2019-10-08 PROBLEM — R55 VASO VAGAL EPISODE: Status: RESOLVED | Noted: 2018-07-05 | Resolved: 2019-10-08

## 2019-10-08 PROBLEM — K13.0 CHAPPED LIPS: Status: RESOLVED | Noted: 2017-11-20 | Resolved: 2019-10-08

## 2019-10-08 PROCEDURE — 99213 OFFICE O/P EST LOW 20 MIN: CPT | Performed by: NURSE PRACTITIONER

## 2019-10-08 PROCEDURE — 3008F BODY MASS INDEX DOCD: CPT | Performed by: NURSE PRACTITIONER

## 2019-10-08 NOTE — PATIENT INSTRUCTIONS

## 2019-10-08 NOTE — PROGRESS NOTES
Assessment/Plan: Patient is deferring any migraine medication or imaging of her neck  She did buy Excedrin migraines and will try this  She will start he Lexapro 10 mg daily back up  Will recheck her fasting labs along with Vitamin D  Bp stable 116/70  She was advised to have an eye exam done as well as follow up with GYN to have hormone levels done  Will notify her once her labs are back  If any worsening of symptoms she was advised to call the office  No problem-specific Assessment & Plan notes found for this encounter  Problem List Items Addressed This Visit        Endocrine    Hypothyroidism - Primary    Relevant Orders    Comprehensive metabolic panel    CBC and differential    TSH, 3rd generation with Free T4 reflex       Other    BMI 29 0-29 9,adult    Relevant Orders    Comprehensive metabolic panel    CBC and differential    Fatigue    Relevant Orders    Comprehensive metabolic panel    CBC and differential    Frequent headaches    Relevant Orders    Comprehensive metabolic panel    CBC and differential      Other Visit Diagnoses     Screening for cardiovascular condition        Relevant Orders    Lipid panel    Encounter for vitamin deficiency screening        Relevant Orders    Vitamin D 25 hydroxy            Subjective:      Patient ID: Merlinda Ala is a 37 y o  female  Zachary Keita is here today for an acute visit  She is having nausea, headaches, visual disturbances, fatigue, and stress  She did stop taking her Lexapro and feels she may need to start it again  She is having headaches daily and states they do start in the front and radiate to the back of his head  She states she has not had a vision exam done and has not seen GYN  She is having hot flashes and sweating  She states she is just worried something is wrong or her thyroid can be off  She states her menses are irregular and they are heavy at times  She is deferring the Flu vaccine  She offers no other issues         The following portions of the patient's history were reviewed and updated as appropriate:   She  has no past medical history on file  She   Patient Active Problem List    Diagnosis Date Noted    BMI 29 0-29 9,adult 10/08/2019    Fatigue 10/08/2019    Frequent headaches 10/08/2019    Anxiety 10/29/2018    Panic attacks 10/29/2018    Menorrhagia with irregular cycle 07/05/2018    Allergic rhinitis 05/11/2017    Asthma 05/11/2017    Eczema 04/07/2017    Hypothyroidism 10/12/2016    Mild intermittent asthma without complication 71/32/0500     She  has no past surgical history on file  Her family history includes Cancer in her sister; Diabetes in her paternal grandfather  She  reports that she has been smoking cigarettes  She has never used smokeless tobacco  She reports that she does not drink alcohol or use drugs  Current Outpatient Medications   Medication Sig Dispense Refill    albuterol (PROAIR HFA) 90 mcg/act inhaler Inhale 2 puffs every 6 (six) hours as needed for shortness of breath 2 Inhaler 3    ALPRAZolam (XANAX) 0 25 mg tablet Take 1 tablet (0 25 mg total) by mouth daily at bedtime as needed for anxiety 30 tablet 0    betamethasone dipropionate (DIPROSONE) 0 05 % cream Apply topically 2 (two) times a day 45 g 3    Blood Pressure Monitoring (BLOOD PRESSURE MONITOR/WRIST) KIT 1 Package by Other route 2 (two) times a day 1 kit 0    escitalopram (LEXAPRO) 10 mg tablet take 1 tablet by mouth once daily (Patient taking differently: 10 mg as needed ) 30 tablet 3    levothyroxine 50 mcg tablet Take 1 tablet (50 mcg total) by mouth daily 30 tablet 5    loratadine (CLARITIN) 10 mg tablet Take 1 tablet (10 mg total) by mouth daily 30 tablet 3    triamcinolone (KENALOG) 0 1 % cream Apply topically 2 (two) times a day       No current facility-administered medications for this visit        Current Outpatient Medications on File Prior to Visit   Medication Sig    albuterol (PROAIR HFA) 90 mcg/act inhaler Inhale 2 puffs every 6 (six) hours as needed for shortness of breath    ALPRAZolam (XANAX) 0 25 mg tablet Take 1 tablet (0 25 mg total) by mouth daily at bedtime as needed for anxiety    betamethasone dipropionate (DIPROSONE) 0 05 % cream Apply topically 2 (two) times a day    Blood Pressure Monitoring (BLOOD PRESSURE MONITOR/WRIST) KIT 1 Package by Other route 2 (two) times a day    escitalopram (LEXAPRO) 10 mg tablet take 1 tablet by mouth once daily (Patient taking differently: 10 mg as needed )    levothyroxine 50 mcg tablet Take 1 tablet (50 mcg total) by mouth daily    loratadine (CLARITIN) 10 mg tablet Take 1 tablet (10 mg total) by mouth daily    triamcinolone (KENALOG) 0 1 % cream Apply topically 2 (two) times a day     No current facility-administered medications on file prior to visit  She is allergic to cat hair extract; dog epithelium; nuts; and pollen extract       Review of Systems   Constitutional: Positive for fatigue  HENT: Negative  Eyes: Negative  Respiratory: Negative  Cardiovascular: Negative  Gastrointestinal: Negative  Endocrine: Negative  Genitourinary: Negative  Musculoskeletal: Negative  Skin: Negative  Neurological: Positive for headaches  Hematological: Negative  Psychiatric/Behavioral: The patient is nervous/anxious  Objective:      /70 (BP Location: Left arm, Patient Position: Sitting, Cuff Size: Adult)   Pulse 71   Temp (!) 97 4 °F (36 3 °C) (Temporal)   Resp 14   Ht 5' 5" (1 651 m)   Wt 80 3 kg (177 lb 1 6 oz)   SpO2 99%   BMI 29 47 kg/m²          Physical Exam   Constitutional: She is oriented to person, place, and time  She appears well-developed and well-nourished  HENT:   Head: Normocephalic and atraumatic  Right Ear: External ear normal    Left Ear: External ear normal    Nose: Nose normal    Mouth/Throat: Oropharynx is clear and moist    Eyes: Pupils are equal, round, and reactive to light   Conjunctivae and EOM are normal    Neck: Normal range of motion  Neck supple  Cardiovascular: Normal rate, regular rhythm, normal heart sounds and intact distal pulses  Pulmonary/Chest: Effort normal and breath sounds normal    Abdominal: Soft  Bowel sounds are normal    Musculoskeletal: Normal range of motion  Neurological: She is alert and oriented to person, place, and time  Skin: Skin is warm and dry  Capillary refill takes less than 2 seconds  Psychiatric: She has a normal mood and affect  Her behavior is normal  Judgment and thought content normal    Vitals reviewed  BMI Counseling: Body mass index is 29 47 kg/m²  The BMI is above normal  Nutrition recommendations include reducing portion sizes, decreasing overall calorie intake, 3-5 servings of fruits/vegetables daily, reducing fast food intake, consuming healthier snacks, decreasing soda and/or juice intake, moderation in carbohydrate intake, increasing intake of lean protein, reducing intake of saturated fat and trans fat and reducing intake of cholesterol

## 2019-10-10 ENCOUNTER — APPOINTMENT (OUTPATIENT)
Dept: LAB | Facility: CLINIC | Age: 34
End: 2019-10-10
Payer: COMMERCIAL

## 2019-10-10 DIAGNOSIS — Z13.6 SCREENING FOR CARDIOVASCULAR CONDITION: ICD-10-CM

## 2019-10-10 DIAGNOSIS — Z13.21 ENCOUNTER FOR VITAMIN DEFICIENCY SCREENING: ICD-10-CM

## 2019-10-10 DIAGNOSIS — E03.9 HYPOTHYROIDISM, UNSPECIFIED TYPE: ICD-10-CM

## 2019-10-10 DIAGNOSIS — R51.9 FREQUENT HEADACHES: ICD-10-CM

## 2019-10-10 DIAGNOSIS — R53.83 FATIGUE, UNSPECIFIED TYPE: ICD-10-CM

## 2019-10-10 LAB
25(OH)D3 SERPL-MCNC: 29.2 NG/ML (ref 30–100)
ALBUMIN SERPL BCP-MCNC: 4.4 G/DL (ref 3.5–5)
ALP SERPL-CCNC: 86 U/L (ref 46–116)
ALT SERPL W P-5'-P-CCNC: 21 U/L (ref 12–78)
ANION GAP SERPL CALCULATED.3IONS-SCNC: 6 MMOL/L (ref 4–13)
AST SERPL W P-5'-P-CCNC: 14 U/L (ref 5–45)
BASOPHILS # BLD AUTO: 0.04 THOUSANDS/ΜL (ref 0–0.1)
BASOPHILS NFR BLD AUTO: 1 % (ref 0–1)
BILIRUB SERPL-MCNC: 0.5 MG/DL (ref 0.2–1)
BUN SERPL-MCNC: 11 MG/DL (ref 5–25)
CALCIUM SERPL-MCNC: 9.2 MG/DL (ref 8.3–10.1)
CHLORIDE SERPL-SCNC: 107 MMOL/L (ref 100–108)
CHOLEST SERPL-MCNC: 161 MG/DL (ref 50–200)
CO2 SERPL-SCNC: 27 MMOL/L (ref 21–32)
CREAT SERPL-MCNC: 0.91 MG/DL (ref 0.6–1.3)
EOSINOPHIL # BLD AUTO: 0.14 THOUSAND/ΜL (ref 0–0.61)
EOSINOPHIL NFR BLD AUTO: 2 % (ref 0–6)
ERYTHROCYTE [DISTWIDTH] IN BLOOD BY AUTOMATED COUNT: 12.7 % (ref 11.6–15.1)
GFR SERPL CREATININE-BSD FRML MDRD: 81 ML/MIN/1.73SQ M
GLUCOSE P FAST SERPL-MCNC: 88 MG/DL (ref 65–99)
HCT VFR BLD AUTO: 43.1 % (ref 34.8–46.1)
HDLC SERPL-MCNC: 57 MG/DL (ref 40–60)
HGB BLD-MCNC: 13.5 G/DL (ref 11.5–15.4)
IMM GRANULOCYTES # BLD AUTO: 0.01 THOUSAND/UL (ref 0–0.2)
IMM GRANULOCYTES NFR BLD AUTO: 0 % (ref 0–2)
LDLC SERPL CALC-MCNC: 93 MG/DL (ref 0–100)
LYMPHOCYTES # BLD AUTO: 1.47 THOUSANDS/ΜL (ref 0.6–4.47)
LYMPHOCYTES NFR BLD AUTO: 23 % (ref 14–44)
MCH RBC QN AUTO: 31.9 PG (ref 26.8–34.3)
MCHC RBC AUTO-ENTMCNC: 31.3 G/DL (ref 31.4–37.4)
MCV RBC AUTO: 102 FL (ref 82–98)
MONOCYTES # BLD AUTO: 0.41 THOUSAND/ΜL (ref 0.17–1.22)
MONOCYTES NFR BLD AUTO: 7 % (ref 4–12)
NEUTROPHILS # BLD AUTO: 4.25 THOUSANDS/ΜL (ref 1.85–7.62)
NEUTS SEG NFR BLD AUTO: 67 % (ref 43–75)
NONHDLC SERPL-MCNC: 104 MG/DL
NRBC BLD AUTO-RTO: 0 /100 WBCS
PLATELET # BLD AUTO: 319 THOUSANDS/UL (ref 149–390)
PMV BLD AUTO: 10.4 FL (ref 8.9–12.7)
POTASSIUM SERPL-SCNC: 4.2 MMOL/L (ref 3.5–5.3)
PROT SERPL-MCNC: 7.9 G/DL (ref 6.4–8.2)
RBC # BLD AUTO: 4.23 MILLION/UL (ref 3.81–5.12)
SODIUM SERPL-SCNC: 140 MMOL/L (ref 136–145)
TRIGL SERPL-MCNC: 53 MG/DL
TSH SERPL DL<=0.05 MIU/L-ACNC: 3.22 UIU/ML (ref 0.36–3.74)
WBC # BLD AUTO: 6.32 THOUSAND/UL (ref 4.31–10.16)

## 2019-10-10 PROCEDURE — 85025 COMPLETE CBC W/AUTO DIFF WBC: CPT

## 2019-10-10 PROCEDURE — 84443 ASSAY THYROID STIM HORMONE: CPT

## 2019-10-10 PROCEDURE — 80053 COMPREHEN METABOLIC PANEL: CPT

## 2019-10-10 PROCEDURE — 82306 VITAMIN D 25 HYDROXY: CPT

## 2019-10-10 PROCEDURE — 80061 LIPID PANEL: CPT

## 2019-10-10 PROCEDURE — 36415 COLL VENOUS BLD VENIPUNCTURE: CPT

## 2019-10-21 ENCOUNTER — TELEPHONE (OUTPATIENT)
Dept: INTERNAL MEDICINE CLINIC | Facility: CLINIC | Age: 34
End: 2019-10-21

## 2019-10-21 DIAGNOSIS — E55.9 VITAMIN D DEFICIENCY: Primary | ICD-10-CM

## 2019-10-21 NOTE — TELEPHONE ENCOUNTER
Pt called and lmom stating that she would like the high dose Vit  D sent to [facility] in [place] [facility]  Per her labs

## 2019-11-26 ENCOUNTER — TELEPHONE (OUTPATIENT)
Dept: INTERNAL MEDICINE CLINIC | Facility: CLINIC | Age: 34
End: 2019-11-26

## 2019-11-26 DIAGNOSIS — L30.9 ECZEMA, UNSPECIFIED TYPE: Primary | ICD-10-CM

## 2019-11-26 RX ORDER — TRIAMCINOLONE ACETONIDE 1 MG/G
CREAM TOPICAL 2 TIMES DAILY
Qty: 453.6 G | Refills: 1 | Status: SHIPPED | OUTPATIENT
Start: 2019-11-26

## 2019-11-26 NOTE — TELEPHONE ENCOUNTER
Patient called back, did explain this to her  She is going to talk to GYN about both the mirena and run the topamax by them

## 2019-11-26 NOTE — TELEPHONE ENCOUNTER
She would have to ask GYN this I am not sure if any medications would interfer  I did discuss I believe Topamax with her but she has to use caution to avoid pregnancy on this   I can refill her cream for her

## 2019-11-26 NOTE — TELEPHONE ENCOUNTER
Patient called with questions  She stated she is getting an IUD(Mirena) inserted in December  Wants to make sure that her medications are compatible with this  She also c/o of her headaches not being helped by the Excedrin , she was wondering if you can call in the pill you discussed with her for her to try  She would also like a refill on her Triamcinolone cream sent to [facility], a larger bottle if possible

## 2019-12-04 DIAGNOSIS — R51.9 FREQUENT HEADACHES: Primary | ICD-10-CM

## 2019-12-04 RX ORDER — TOPIRAMATE 50 MG/1
50 TABLET, FILM COATED ORAL DAILY
Qty: 30 TABLET | Refills: 0 | Status: SHIPPED | OUTPATIENT
Start: 2019-12-04 | End: 2020-01-24 | Stop reason: SDUPTHER

## 2019-12-04 NOTE — TELEPHONE ENCOUNTER
Patient called back, she stated she spoke with her OBGYN office and was told that no medications will interfere with her Mirena and the Topamax is ok to use  She would like to try the Topamax

## 2019-12-11 DIAGNOSIS — F41.9 ANXIETY: ICD-10-CM

## 2019-12-11 RX ORDER — ESCITALOPRAM OXALATE 10 MG/1
10 TABLET ORAL AS NEEDED
Qty: 30 TABLET | Refills: 3 | Status: SHIPPED | OUTPATIENT
Start: 2019-12-11 | End: 2020-04-06 | Stop reason: SDUPTHER

## 2020-01-16 ENCOUNTER — TELEPHONE (OUTPATIENT)
Dept: INTERNAL MEDICINE CLINIC | Facility: CLINIC | Age: 35
End: 2020-01-16

## 2020-01-16 DIAGNOSIS — J06.9 UPPER RESPIRATORY TRACT INFECTION, UNSPECIFIED TYPE: Primary | ICD-10-CM

## 2020-01-16 RX ORDER — AZITHROMYCIN 250 MG/1
TABLET, FILM COATED ORAL
Qty: 6 TABLET | Refills: 0 | Status: SHIPPED | OUTPATIENT
Start: 2020-01-16 | End: 2020-01-20

## 2020-01-16 NOTE — TELEPHONE ENCOUNTER
Received a call from Tammy stating that Friday night she started not feeling well  Throat hurts, coughing, green mucous, headaches, fatigue  She is asking for a zpack be sent into the pharmacy  Did speak with Vilma Melissa and she stated she can send it in later once she's home  Did let Tammy know  [facility]

## 2020-01-22 DIAGNOSIS — Z20.828 EXPOSURE TO INFLUENZA: Primary | ICD-10-CM

## 2020-01-22 RX ORDER — OSELTAMIVIR PHOSPHATE 75 MG/1
75 CAPSULE ORAL DAILY
Qty: 10 CAPSULE | Refills: 0 | Status: SHIPPED | OUTPATIENT
Start: 2020-01-22 | End: 2020-02-01

## 2020-01-23 DIAGNOSIS — E03.9 HYPOTHYROIDISM, UNSPECIFIED TYPE: ICD-10-CM

## 2020-01-23 RX ORDER — LEVOTHYROXINE SODIUM 0.05 MG/1
50 TABLET ORAL DAILY
Qty: 30 TABLET | Refills: 5 | Status: SHIPPED | OUTPATIENT
Start: 2020-01-23 | End: 2020-08-31

## 2020-01-24 DIAGNOSIS — R51.9 FREQUENT HEADACHES: ICD-10-CM

## 2020-01-27 RX ORDER — TOPIRAMATE 50 MG/1
50 TABLET, FILM COATED ORAL DAILY
Qty: 30 TABLET | Refills: 3 | Status: SHIPPED | OUTPATIENT
Start: 2020-01-27 | End: 2020-07-10 | Stop reason: SDUPTHER

## 2020-01-31 ENCOUNTER — TRANSCRIBE ORDERS (OUTPATIENT)
Dept: ADMINISTRATIVE | Facility: HOSPITAL | Age: 35
End: 2020-01-31

## 2020-01-31 ENCOUNTER — HOSPITAL ENCOUNTER (OUTPATIENT)
Dept: RADIOLOGY | Facility: HOSPITAL | Age: 35
Discharge: HOME | End: 2020-01-31
Payer: COMMERCIAL

## 2020-01-31 DIAGNOSIS — M54.2 NECK PAIN: Primary | ICD-10-CM

## 2020-01-31 DIAGNOSIS — R51.9 NONINTRACTABLE HEADACHE, UNSPECIFIED CHRONICITY PATTERN, UNSPECIFIED HEADACHE TYPE: ICD-10-CM

## 2020-01-31 DIAGNOSIS — M54.2 NECK PAIN: ICD-10-CM

## 2020-01-31 PROCEDURE — 72050 X-RAY EXAM NECK SPINE 4/5VWS: CPT

## 2020-03-09 ENCOUNTER — TELEPHONE (OUTPATIENT)
Dept: INTERNAL MEDICINE CLINIC | Facility: CLINIC | Age: 35
End: 2020-03-09

## 2020-03-09 DIAGNOSIS — J06.9 UPPER RESPIRATORY TRACT INFECTION, UNSPECIFIED TYPE: Primary | ICD-10-CM

## 2020-03-09 RX ORDER — AZITHROMYCIN 250 MG/1
TABLET, FILM COATED ORAL
Qty: 6 TABLET | Refills: 0 | Status: SHIPPED | OUTPATIENT
Start: 2020-03-09 | End: 2020-03-13

## 2020-04-06 DIAGNOSIS — F41.9 ANXIETY: ICD-10-CM

## 2020-04-06 RX ORDER — ESCITALOPRAM OXALATE 10 MG/1
10 TABLET ORAL AS NEEDED
Qty: 30 TABLET | Refills: 3 | Status: SHIPPED | OUTPATIENT
Start: 2020-04-06 | End: 2020-10-01

## 2020-04-24 ENCOUNTER — TELEPHONE (OUTPATIENT)
Dept: INTERNAL MEDICINE CLINIC | Facility: CLINIC | Age: 35
End: 2020-04-24

## 2020-05-29 ENCOUNTER — TELEPHONE (OUTPATIENT)
Dept: ADMINISTRATIVE | Facility: OTHER | Age: 35
End: 2020-05-29

## 2020-05-29 ENCOUNTER — OFFICE VISIT (OUTPATIENT)
Dept: INTERNAL MEDICINE CLINIC | Facility: CLINIC | Age: 35
End: 2020-05-29
Payer: COMMERCIAL

## 2020-05-29 VITALS — TEMPERATURE: 96.8 F

## 2020-05-29 DIAGNOSIS — H92.02 LEFT EAR PAIN: Primary | ICD-10-CM

## 2020-05-29 PROCEDURE — 99213 OFFICE O/P EST LOW 20 MIN: CPT | Performed by: NURSE PRACTITIONER

## 2020-05-29 RX ORDER — METHYLPREDNISOLONE 4 MG/1
TABLET ORAL
Qty: 21 EACH | Refills: 0 | Status: SHIPPED | OUTPATIENT
Start: 2020-05-29 | End: 2020-08-24

## 2020-07-10 DIAGNOSIS — R51.9 FREQUENT HEADACHES: ICD-10-CM

## 2020-07-10 RX ORDER — TOPIRAMATE 50 MG/1
50 TABLET, FILM COATED ORAL DAILY
Qty: 30 TABLET | Refills: 3 | Status: SHIPPED | OUTPATIENT
Start: 2020-07-10 | End: 2020-08-24 | Stop reason: DRUGHIGH

## 2020-08-24 ENCOUNTER — OFFICE VISIT (OUTPATIENT)
Dept: INTERNAL MEDICINE CLINIC | Facility: CLINIC | Age: 35
End: 2020-08-24
Payer: COMMERCIAL

## 2020-08-24 VITALS
DIASTOLIC BLOOD PRESSURE: 64 MMHG | SYSTOLIC BLOOD PRESSURE: 108 MMHG | WEIGHT: 182.9 LBS | TEMPERATURE: 97.7 F | HEART RATE: 82 BPM | OXYGEN SATURATION: 98 % | BODY MASS INDEX: 30.47 KG/M2 | HEIGHT: 65 IN

## 2020-08-24 DIAGNOSIS — Z13.6 SCREENING FOR CARDIOVASCULAR CONDITION: ICD-10-CM

## 2020-08-24 DIAGNOSIS — R51.9 FREQUENT HEADACHES: ICD-10-CM

## 2020-08-24 DIAGNOSIS — Z11.1 PPD SCREENING TEST: ICD-10-CM

## 2020-08-24 DIAGNOSIS — Z00.00 ROUTINE ADULT HEALTH MAINTENANCE: Primary | ICD-10-CM

## 2020-08-24 DIAGNOSIS — F41.9 ANXIETY: ICD-10-CM

## 2020-08-24 DIAGNOSIS — E55.9 VITAMIN D DEFICIENCY: ICD-10-CM

## 2020-08-24 DIAGNOSIS — F41.0 PANIC ATTACKS: ICD-10-CM

## 2020-08-24 DIAGNOSIS — E03.9 HYPOTHYROIDISM, UNSPECIFIED TYPE: ICD-10-CM

## 2020-08-24 PROBLEM — H92.02 LEFT EAR PAIN: Status: RESOLVED | Noted: 2020-05-29 | Resolved: 2020-08-24

## 2020-08-24 PROCEDURE — 99395 PREV VISIT EST AGE 18-39: CPT | Performed by: NURSE PRACTITIONER

## 2020-08-24 PROCEDURE — 3008F BODY MASS INDEX DOCD: CPT | Performed by: NURSE PRACTITIONER

## 2020-08-24 PROCEDURE — 86580 TB INTRADERMAL TEST: CPT | Performed by: NURSE PRACTITIONER

## 2020-08-24 RX ORDER — ALPRAZOLAM 0.25 MG/1
0.25 TABLET ORAL
Qty: 30 TABLET | Refills: 0 | Status: SHIPPED | OUTPATIENT
Start: 2020-08-24 | End: 2021-03-08 | Stop reason: SDUPTHER

## 2020-08-24 RX ORDER — TOPIRAMATE 100 MG/1
100 TABLET, FILM COATED ORAL DAILY
Qty: 30 TABLET | Refills: 3 | Status: SHIPPED | OUTPATIENT
Start: 2020-08-24 | End: 2021-01-03

## 2020-08-24 NOTE — PATIENT INSTRUCTIONS
Wellness Visit for Adults   WHAT YOU NEED TO KNOW:   What is a wellness visit? A wellness visit is when you see your healthcare provider to get screened for health problems  You can also get advice on how to stay healthy  Write down your questions so you remember to ask them  Ask your healthcare provider how often you should have a wellness visit  What happens at a wellness visit? Your healthcare provider will ask about your health, and your family history of health problems  This includes high blood pressure, heart disease, and cancer  He or she will ask if you have symptoms that concern you, if you smoke, and about your mood  You may also be asked about your intake of medicines, supplements, food, and alcohol  Any of the following may be done:  · Your weight  will be checked  Your height may also be checked so your body mass index (BMI) can be calculated  Your BMI shows if you are at a healthy weight  · Your blood pressure  and heart rate will be checked  Your temperature may also be checked  · Blood and urine tests  may be done  Blood tests may be done to check your cholesterol levels  Abnormal cholesterol levels increase your risk for heart disease and stroke  You may also need a blood or urine test to check for diabetes if you are at increased risk  Urine tests may be done to look for signs of an infection or kidney disease  · A physical exam  includes checking your heartbeat and lungs with a stethoscope  Your healthcare provider may also check your skin to look for sun damage  · Screening tests  may be recommended  A screening test is done to check for diseases that may not cause symptoms  The screening tests you may need depend on your age, gender, family history, and lifestyle habits  For example, colorectal screening may be recommended if you are 48years old or older  What screening tests do I need if I am a woman? · A Pap smear  is used to screen for cervical cancer   Pap smears are usually done every 3 to 5 years depending on your age  You may need them more often if you have had abnormal Pap smear test results in the past  Ask your healthcare provider how often you should have a Pap smear  · A mammogram  is an x-ray of your breasts to screen for breast cancer  Experts recommend mammograms every 2 years starting at age 48 years  You may need a mammogram at age 52 years or younger if you have an increased risk for breast cancer  Talk to your healthcare provider about when you should start having mammograms and how often you need them  What vaccines might I need? · Get an influenza vaccine  every year  The influenza vaccine protects you from the flu  Several types of viruses cause the flu  The viruses change over time, so new vaccines are made each year  · Get a tetanus-diphtheria (Td) booster vaccine  every 10 years  This vaccine protects you against tetanus and diphtheria  Tetanus is a severe infection that may cause painful muscle spasms and lockjaw  Diphtheria is a severe bacterial infection that causes a thick covering in the back of your mouth and throat  · Get a human papillomavirus (HPV) vaccine  if you are female and aged 23 to 32 or male 23 to 24 and never received it  This vaccine protects you from HPV infection  HPV is the most common infection spread by sexual contact  HPV may also cause vaginal, penile, and anal cancers  · Get a pneumococcal vaccine  if you are aged 72 years or older  The pneumococcal vaccine is an injection given to protect you from pneumococcal disease  Pneumococcal disease is an infection caused by pneumococcal bacteria  The infection may cause pneumonia, meningitis, or an ear infection  · Get a shingles vaccine  if you are aged 61 or older, even if you have had shingles before  The shingles vaccine is an injection to protect you from the varicella-zoster virus  This is the same virus that causes chickenpox   Shingles is a painful rash that develops in people who had chickenpox or have been exposed to the virus  How can I eat healthy? My Plate is a model for planning healthy meals  It shows the types and amounts of foods that should go on your plate  Fruits and vegetables make up about half of your plate, and grains and protein make up the other half  A serving of dairy is included on the side of your plate  The amount of calories and serving sizes you need depends on your age, gender, weight, and height  Examples of healthy foods are listed below:  · Eat a variety of vegetables  such as dark green, red, and orange vegetables  You can also include canned vegetables low in sodium (salt) and frozen vegetables without added butter or sauces  · Eat a variety of fresh fruits , canned fruit in 100% juice, frozen fruit, and dried fruit  · Include whole grains  At least half of the grains you eat should be whole grains  Examples include whole-wheat bread, wheat pasta, brown rice, and whole-grain cereals such as oatmeal     · Eat a variety of protein foods such as seafood (fish and shellfish), lean meat, and poultry without skin (turkey and chicken)  Examples of lean meats include pork leg, shoulder, or tenderloin, and beef round, sirloin, tenderloin, and extra lean ground beef  Other protein foods include eggs and egg substitutes, beans, peas, soy products, nuts, and seeds  · Choose low-fat dairy products such as skim or 1% milk or low-fat yogurt, cheese, and cottage cheese  · Limit unhealthy fats  such as butter, hard margarine, and shortening  How much exercise do I need? Exercise at least 30 minutes per day on most days of the week  Some examples of exercise include walking, biking, dancing, and swimming  You can also fit in more physical activity by taking the stairs instead of the elevator or parking farther away from stores  Include muscle strengthening activities 2 days each week  Regular exercise provides many health benefits  It helps you manage your weight, and decreases your risk for type 2 diabetes, heart disease, stroke, and high blood pressure  Exercise can also help improve your mood  Ask your healthcare provider about the best exercise plan for you  What are some general health and safety guidelines I should follow? · Do not smoke  Nicotine and other chemicals in cigarettes and cigars can cause lung damage  Ask your healthcare provider for information if you currently smoke and need help to quit  E-cigarettes or smokeless tobacco still contain nicotine  Talk to your healthcare provider before you use these products  · Limit alcohol  A drink of alcohol is 12 ounces of beer, 5 ounces of wine, or 1½ ounces of liquor  · Lose weight, if needed  Being overweight increases your risk of certain health conditions  These include heart disease, high blood pressure, type 2 diabetes, and certain types of cancer  · Protect your skin  Do not sunbathe or use tanning beds  Use sunscreen with a SPF 15 or higher  Apply sunscreen at least 15 minutes before you go outside  Reapply sunscreen every 2 hours  Wear protective clothing, hats, and sunglasses when you are outside  · Drive safely  Always wear your seatbelt  Make sure everyone in your car wears a seatbelt  A seatbelt can save your life if you are in an accident  Do not use your cell phone when you are driving  This could distract you and cause an accident  Pull over if you need to make a call or send a text message  · Practice safe sex  Use latex condoms if are sexually active and have more than one partner  Your healthcare provider may recommend screening tests for sexually transmitted infections (STIs)  · Wear helmets, lifejackets, and protective gear  Always wear a helmet when you ride a bike or motorcycle, go skiing, or play sports that could cause a head injury  Wear protective equipment when you play sports   Wear a lifejacket when you are on a boat or doing water sports  CARE AGREEMENT:   You have the right to help plan your care  Learn about your health condition and how it may be treated  Discuss treatment options with your caregivers to decide what care you want to receive  You always have the right to refuse treatment  The above information is an  only  It is not intended as medical advice for individual conditions or treatments  Talk to your doctor, nurse or pharmacist before following any medical regimen to see if it is safe and effective for you  © 2017 [employer]  Information is for End User's use only and may not be sold, redistributed or otherwise used for commercial purposes  All illustrations and images included in CareNotes® are the copyrighted property of [employer]  or [employer]  Low Fat Diet   AMBULATORY CARE:   A low-fat diet  is an eating plan that is low in total fat, unhealthy fat, and cholesterol  You may need to follow a low-fat diet if you have trouble digesting or absorbing fat  You may also need to follow this diet if you have high cholesterol  You can also lower your cholesterol by increasing the amount of fiber in your diet  Soluble fiber is a type of fiber that helps to decrease cholesterol levels  Different types of fat in food:   · Limit unhealthy fats  A diet that is high in cholesterol, saturated fat, and trans fat may cause unhealthy cholesterol levels  Unhealthy cholesterol levels increase your risk of heart disease  ¨ Cholesterol:  Limit intake of cholesterol to less than 200 mg per day  Cholesterol is found in meat, eggs, and dairy  ¨ Saturated fat:  Limit saturated fat to less than 7% of your total daily calories  Ask your dietitian how many calories you need each day  Saturated fat is found in butter, cheese, ice cream, whole milk, and palm oil  Saturated fat is also found in meat, such as beef, pork, chicken skin, and processed meats   Processed meats include sausage, hot dogs, and bologna  ¨ Trans fat:  Avoid trans fat as much as possible  Trans fat is used in fried and baked foods  Foods that say trans fat free on the label may still have up to 0 5 grams of trans fat per serving  · Include healthy fats  Replace foods that are high in saturated and trans fat with foods high in healthy fats  This may help to decrease high cholesterol levels  ¨ Monounsaturated fats: These are found in avocados, nuts, and vegetable oils, such as olive, canola, and sunflower oil  ¨ Polyunsaturated fats: These can be found in vegetable oils, such as soybean or corn oil  Omega-3 fats can help to decrease the risk of heart disease  Omega-3 fats are found in fish, such as salmon, herring, trout, and tuna  Omega-3 fats can also be found in plant foods, such as walnuts, flaxseed, soybeans, and canola oil    Foods to limit or avoid:   · Grains:      ¨ Snacks that are made with partially hydrogenated oils, such as chips, regular crackers, and butter-flavored popcorn    ¨ High-fat baked goods, such as biscuits, croissants, doughnuts, pies, cookies, and pastries    · Dairy:      ¨ Whole milk, 2% milk, and yogurt and ice cream made with whole milk    ¨ Half and half creamer, heavy cream, and whipping cream    ¨ Cheese, cream cheese, and sour cream    · Meats and proteins:      ¨ High-fat cuts of meat (T-bone steak, regular hamburger, and ribs)    ¨ Fried meat, poultry (turkey and chicken), and fish    ¨ Poultry (chicken and turkey) with skin    ¨ Cold cuts (salami or bologna), hot dogs, fulton, and sausage    ¨ Whole eggs and egg yolks    · Vegetables and fruits with added fat:      ¨ Fried vegetables or vegetables in butter or high-fat sauces, such as cream or cheese sauces    ¨ Fried fruit or fruit served with butter or cream    · Fats:      ¨ Butter, stick margarine, and shortening    ¨ Coconut, palm oil, and palm kernel oil  Foods to include:   · Grains:      ¨ Whole-grain breads, cereals, pasta, and brown rice    ¨ Low-fat crackers and pretzels    · Vegetables and fruits:      ¨ Fresh, frozen, or canned vegetables (no salt or low-sodium)    ¨ Fresh, frozen, dried, or canned fruit (canned in light syrup or fruit juice)    ¨ Avocado    · Low-fat dairy products:      ¨ Nonfat (skim) or 1% milk    ¨ Nonfat or low-fat cheese, yogurt, and cottage cheese    · Meats and proteins:      ¨ Chicken or turkey with no skin    ¨ Baked or broiled fish    ¨ Lean beef and pork (loin, round, extra lean hamburger)    ¨ Beans and peas, unsalted nuts, soy products    ¨ Egg whites and substitutes    ¨ Seeds and nuts    · Fats:      ¨ Unsaturated oil, such as canola, olive, peanut, soybean, or sunflower oil    ¨ Soft or liquid margarine and vegetable oil spread    ¨ Low-fat salad dressing  Other ways to decrease fat:   · Read food labels before you buy foods  Choose foods that have less than 30% of calories from fat  Choose low-fat or fat-free dairy products  Remember that fat free does not mean calorie free  These foods still contain calories, and too many calories can lead to weight gain  · Trim fat from meat and avoid fried food  Trim all visible fat from meat before you cook it  Remove the skin from poultry  Do not hoyt meat, fish, or poultry  Bake, roast, boil, or broil these foods instead  Avoid fried foods  Eat a baked potato instead of [place] fries  Steam vegetables instead of sautéing them in butter  · Add less fat to foods  Use imitation fulton bits on salads and baked potatoes instead of regular fulton bits  Use fat-free or low-fat salad dressings instead of regular dressings  Use low-fat or nonfat butter-flavored topping instead of regular butter or margarine on popcorn and other foods  Ways to decrease fat in recipes:  Replace high-fat ingredients with low-fat or nonfat ones   This may cause baked goods to be drier than usual  You may need to use nonfat cooking spray on pans to prevent food from sticking  You also may need to change the amount of other ingredients, such as water, in the recipe  Try the following:  · Use low-fat or light margarine instead of regular margarine or shortening  · Use lean ground turkey breast or chicken, or lean ground beef (less than 5% fat) instead of hamburger  · Add 1 teaspoon of canola oil to 8 ounces of skim milk instead of using cream or half and half  · Use grated zucchini, carrots, or apples in breads instead of coconut  · Use blenderized, low-fat cottage cheese, plain tofu, or low-fat ricotta cheese instead of cream cheese  · Use 1 egg white and 1 teaspoon of canola oil, or use ¼ cup (2 ounces) of fat-free egg substitute instead of a whole egg  · Replace half of the oil that is called for in a recipe with applesauce when you bake  Use 3 tablespoons of cocoa powder and 1 tablespoon of canola oil instead of a square of baking chocolate  How to increase fiber:  Eat enough high-fiber foods to get 20 to 30 grams of fiber every day  Slowly increase your fiber intake to avoid stomach cramps, gas, and other problems  · Eat 3 ounces of whole-grain foods each day  An ounce is about 1 slice of bread  Eat whole-grain breads, such as whole-wheat bread  Whole wheat, whole-wheat flour, or other whole grains should be listed as the first ingredient on the food label  Replace white flour with whole-grain flour or use half of each in recipes  Whole-grain flour is heavier than white flour, so you may have to add more yeast or baking powder  · Eat a high-fiber cereal for breakfast   Oatmeal is a good source of soluble fiber  Look for cereals that have bran or fiber in the name  Choose whole-grain products, such as brown rice, barley, and whole-wheat pasta  · Eat more beans, peas, and lentils  For example, add beans to soups or salads  Eat at least 5 cups of fruits and vegetables each day   Eat fruits and vegetables with the peel because the peel is high in fiber  © 2017 [address] [facility] Information is for End User's use only and may not be sold, redistributed or otherwise used for commercial purposes  All illustrations and images included in CareNotes® are the copyrighted property of [employer]  or Tima Thomas  The above information is an  only  It is not intended as medical advice for individual conditions or treatments  Talk to your doctor, nurse or pharmacist before following any medical regimen to see if it is safe and effective for you  Heart Healthy Diet   AMBULATORY CARE:   A heart healthy diet  is an eating plan low in total fat, unhealthy fats, and sodium (salt)  A heart healthy diet helps decrease your risk for heart disease and stroke  Limit the amount of fat you eat to 25% to 35% of your total daily calories  Limit sodium to less than 2,300 mg each day  Healthy fats:  Healthy fats can help improve cholesterol levels  The risk for heart disease is decreased when cholesterol levels are normal  Choose healthy fats, such as the following:  · Unsaturated fat  is found in foods such as soybean, canola, olive, corn, and safflower oils  It is also found in soft tub margarine that is made with liquid vegetable oil  · Omega-3 fat  is found in certain fish, such as salmon, tuna, and trout, and in walnuts and flaxseed  Unhealthy fats:  Unhealthy fats can cause unhealthy cholesterol levels in your blood and increase your risk of heart disease  Limit unhealthy fats, such as the following:  · Cholesterol  is found in animal foods, such as eggs and lobster, and in dairy products made from whole milk  Limit cholesterol to less than 300 milligrams (mg) each day  You may need to limit cholesterol to 200 mg each day if you have heart disease  · Saturated fat  is found in meats, such as fulton and hamburger  It is also found in chicken or turkey skin, whole milk, and butter   Limit saturated fat to less than 7% of your total daily calories  Limit saturated fat to less than 6% if you have heart disease or are at increased risk for it  · Trans fat  is found in packaged foods, such as potato chips and cookies  It is also in hard margarine, some fried foods, and shortening  Avoid trans fats as much as possible    Heart healthy foods and drinks to include:  Ask your dietitian or healthcare provider how many servings to have from each of the following food groups:  · Grains:      ¨ Whole-wheat breads, cereals, and pastas, and brown rice    ¨ Low-fat, low-sodium crackers and chips    · Vegetables:      ¨ Broccoli, green beans, green peas, and spinach    ¨ Collards, kale, and lima beans    ¨ Carrots, sweet potatoes, tomatoes, and peppers    ¨ Canned vegetables with no salt added    · Fruits:      ¨ Bananas, peaches, pears, and pineapple    ¨ Grapes, raisins, and dates    ¨ Oranges, tangerines, grapefruit, orange juice, and grapefruit juice    ¨ Apricots, mangoes, melons, and papaya    ¨ Raspberries and strawberries    ¨ Canned fruit with no added sugar    · Low-fat dairy products:      ¨ Nonfat (skim) milk, 1% milk, and low-fat almond, cashew, or soy milks fortified with calcium    ¨ Low-fat cheese, regular or frozen yogurt, and cottage cheese    · Meats and proteins , such as lean cuts of beef and pork (loin, leg, round), skinless chicken and turkey, legumes, soy products, egg whites, and nuts  Foods and drinks to limit or avoid:  Ask your dietitian or healthcare provider about these and other foods that are high in unhealthy fat, sodium, and sugar:  · Snack or packaged foods , such as frozen dinners, cookies, macaroni and cheese, and cereals with more than 300 mg of sodium per serving    · Canned or dry mixes  for cakes, soups, sauces, or gravies    · Vegetables with added sodium , such as instant potatoes, vegetables with added sauces, or regular canned vegetables    · Other foods high in sodium , such as ketchup, barbecue sauce, salad dressing, pickles, olives, soy sauce, and miso    · High-fat dairy foods  such as whole or 2% milk, cream cheese, or sour cream, and cheeses     · High-fat protein foods  such as high-fat cuts of beef (T-bone steaks, ribs), chicken or turkey with skin, and organ meats, such as liver    · Cured or smoked meats , such as hot dogs, fulton, and sausage    · Unhealthy fats and oils , such as butter, stick margarine, shortening, and cooking oils such as coconut or palm oil    · Food and drinks high in sugar , such as soft drinks (soda), sports drinks, sweetened tea, candy, cake, cookies, pies, and doughnuts  Other diet guidelines to follow:   · Eat more foods containing omega-3 fats  Eat fish high in omega-3 fats at least 2 times a week  · Limit alcohol  Too much alcohol can damage your heart and raise your blood pressure  Women should limit alcohol to 1 drink a day  Men should limit alcohol to 2 drinks a day  A drink of alcohol is 12 ounces of beer, 5 ounces of wine, or 1½ ounces of liquor  · Choose low-sodium foods  High-sodium foods can lead to high blood pressure  Add little or no salt to food you prepare  Use herbs and spices in place of salt  · Eat more fiber  to help lower cholesterol levels  Eat at least 5 servings of fruits and vegetables each day  Eat 3 ounces of whole-grain foods each day  Legumes (beans) are also a good source of fiber  Lifestyle guidelines:   · Do not smoke  Nicotine and other chemicals in cigarettes and cigars can cause lung and heart damage  Ask your healthcare provider for information if you currently smoke and need help to quit  E-cigarettes or smokeless tobacco still contain nicotine  Talk to your healthcare provider before you use these products  · Exercise regularly  to help you maintain a healthy weight and improve your blood pressure and cholesterol levels  Ask your healthcare provider about the best exercise plan for you   Do not start an exercise program without asking your healthcare provider  Follow up with your healthcare provider as directed:  Write down your questions so you remember to ask them during your visits  © 2017 [employer] Information is for End User's use only and may not be sold, redistributed or otherwise used for commercial purposes  All illustrations and images included in CareNotes® are the copyrighted property of [employer]  or Tima Thomas  The above information is an  only  It is not intended as medical advice for individual conditions or treatments  Talk to your doctor, nurse or pharmacist before following any medical regimen to see if it is safe and effective for you

## 2020-08-24 NOTE — PROGRESS NOTES
Assessment/Plan: Anticipatory guidance re: diet, exercise, and safety  Will increase her Topamax to 100 mg daily  Will repeat fasting labs for patient  She is up to date on her Gyn exam  Will follow up as needed or one year  Inquiry done and consistent with prescribing history for Xanax  She will call office if any issues  No problem-specific Assessment & Plan notes found for this encounter  Problem List Items Addressed This Visit        Endocrine    Hypothyroidism    Relevant Orders    Comprehensive metabolic panel    CBC and differential    TSH, 3rd generation with Free T4 reflex       Other    Anxiety    Relevant Orders    Comprehensive metabolic panel    CBC and differential    Panic attacks    Relevant Orders    Comprehensive metabolic panel    CBC and differential    Frequent headaches    Relevant Medications    topiramate (TOPAMAX) 100 mg tablet    Routine adult health maintenance - Primary    Relevant Orders    Comprehensive metabolic panel    CBC and differential    Screening for cardiovascular condition    Relevant Orders    Lipid panel    Vitamin D deficiency    Relevant Orders    Vitamin D 25 hydroxy    BMI 30 0-30 9,adult    PPD screening test    Relevant Orders    TB Skin Test            Subjective:      Patient ID: Mohit Munguia is a 38 y o  female  Soheila Bustillosr is for a routine wellness  She is doing well and starting a new job at [employer]  She states she is nervous and excited at the same time  She states she is having some anxiety and would like her Xanax renewed  She states she is still getting headaches and is taking Topamax daily and does not seem to be helping  She would like to try and increase this  She denies any chest pain, SOB, or palpitations  She does have a IUD and is up to date on her GYN exams  She states she is trying to watch her weight and does know she gained  She does drink socially and denies any tobacco or drug use  She offers no other issues        The following portions of the patient's history were reviewed and updated as appropriate:   She  has no past medical history on file  She   Patient Active Problem List    Diagnosis Date Noted    Routine adult health maintenance 08/24/2020    Screening for cardiovascular condition 08/24/2020    Vitamin D deficiency 08/24/2020    BMI 30 0-30 9,adult 08/24/2020    PPD screening test 08/24/2020    BMI 29 0-29 9,adult 10/08/2019    Fatigue 10/08/2019    Frequent headaches 10/08/2019    Anxiety 10/29/2018    Panic attacks 10/29/2018    Menorrhagia with irregular cycle 07/05/2018    Allergic rhinitis 05/11/2017    Asthma 05/11/2017    Eczema 04/07/2017    Hypothyroidism 10/12/2016    Mild intermittent asthma without complication 68/24/4895     She  has no past surgical history on file  Her family history includes Cancer in her sister; Diabetes in her paternal grandfather  She  reports that she has been smoking cigarettes  She has never used smokeless tobacco  She reports that she does not drink alcohol or use drugs    Current Outpatient Medications   Medication Sig Dispense Refill    albuterol (PROAIR HFA) 90 mcg/act inhaler Inhale 2 puffs every 6 (six) hours as needed for shortness of breath 2 Inhaler 3    ALPRAZolam (XANAX) 0 25 mg tablet Take 1 tablet (0 25 mg total) by mouth daily at bedtime as needed for anxiety 30 tablet 0    betamethasone dipropionate (DIPROSONE) 0 05 % cream Apply topically 2 (two) times a day 45 g 3    Blood Pressure Monitoring (BLOOD PRESSURE MONITOR/WRIST) KIT 1 Package by Other route 2 (two) times a day 1 kit 0    Cholecalciferol (VITAMIN D3) 76303 units TABS Take one tablet by mouth once weekly 4 tablet 3    escitalopram (LEXAPRO) 10 mg tablet Take 1 tablet (10 mg total) by mouth as needed (AS NEEDED FOR ANXIETY) 30 tablet 3    levonorgestrel (MIRENA) 20 MCG/24HR IUD 1 each by Intrauterine route      levothyroxine 50 mcg tablet Take 1 tablet (50 mcg total) by mouth daily 30 tablet 5    triamcinolone (KENALOG) 0 1 % cream Apply topically 2 (two) times a day 453 6 g 1    topiramate (TOPAMAX) 100 mg tablet Take 1 tablet (100 mg total) by mouth daily 30 tablet 3     No current facility-administered medications for this visit  Current Outpatient Medications on File Prior to Visit   Medication Sig    albuterol (PROAIR HFA) 90 mcg/act inhaler Inhale 2 puffs every 6 (six) hours as needed for shortness of breath    ALPRAZolam (XANAX) 0 25 mg tablet Take 1 tablet (0 25 mg total) by mouth daily at bedtime as needed for anxiety    betamethasone dipropionate (DIPROSONE) 0 05 % cream Apply topically 2 (two) times a day    Blood Pressure Monitoring (BLOOD PRESSURE MONITOR/WRIST) KIT 1 Package by Other route 2 (two) times a day    Cholecalciferol (VITAMIN D3) 10316 units TABS Take one tablet by mouth once weekly    escitalopram (LEXAPRO) 10 mg tablet Take 1 tablet (10 mg total) by mouth as needed (AS NEEDED FOR ANXIETY)    levonorgestrel (MIRENA) 20 MCG/24HR IUD 1 each by Intrauterine route    levothyroxine 50 mcg tablet Take 1 tablet (50 mcg total) by mouth daily    triamcinolone (KENALOG) 0 1 % cream Apply topically 2 (two) times a day    [DISCONTINUED] topiramate (TOPAMAX) 50 MG tablet Take 1 tablet (50 mg total) by mouth daily    [DISCONTINUED] methylPREDNISolone 4 MG tablet therapy pack Use as directed on package (Patient not taking: Reported on 8/24/2020)     No current facility-administered medications on file prior to visit  She is allergic to cat hair extract; dog epithelium; nuts; and pollen extract       Review of Systems   Constitutional: Negative  HENT: Negative  Eyes: Negative  Respiratory: Negative  Cardiovascular: Negative  Gastrointestinal: Negative  Endocrine: Negative  Genitourinary: Negative  Musculoskeletal: Negative  Skin: Negative  Allergic/Immunologic: Negative  Neurological: Positive for headaches  Hematological: Negative  Psychiatric/Behavioral: Negative  Objective:      /64 (BP Location: Left arm, Patient Position: Sitting, Cuff Size: Adult)   Pulse 82   Temp 97 7 °F (36 5 °C) (Temporal)   Ht 5' 5" (1 651 m)   Wt 83 kg (182 lb 14 4 oz)   SpO2 98%   BMI 30 44 kg/m²          Physical Exam  Vitals signs reviewed  Constitutional:       Appearance: Normal appearance  She is normal weight  HENT:      Head: Normocephalic and atraumatic  Right Ear: Tympanic membrane, ear canal and external ear normal       Left Ear: Tympanic membrane, ear canal and external ear normal       Nose: Nose normal       Mouth/Throat:      Mouth: Mucous membranes are moist       Pharynx: Oropharynx is clear  Eyes:      Extraocular Movements: Extraocular movements intact  Conjunctiva/sclera: Conjunctivae normal       Pupils: Pupils are equal, round, and reactive to light  Neck:      Musculoskeletal: Normal range of motion and neck supple  Cardiovascular:      Rate and Rhythm: Normal rate and regular rhythm  Pulses: Normal pulses  Heart sounds: Normal heart sounds  Pulmonary:      Effort: Pulmonary effort is normal       Breath sounds: Normal breath sounds  Abdominal:      General: Abdomen is flat  Bowel sounds are normal       Palpations: Abdomen is soft  Musculoskeletal: Normal range of motion  Skin:     General: Skin is warm and dry  Capillary Refill: Capillary refill takes less than 2 seconds  Neurological:      General: No focal deficit present  Mental Status: She is alert and oriented to person, place, and time  Mental status is at baseline  Psychiatric:         Mood and Affect: Mood normal          Behavior: Behavior normal          Thought Content: Thought content normal          Judgment: Judgment normal          BMI Counseling: Body mass index is 30 44 kg/m²   The BMI is above normal  Nutrition recommendations include reducing portion sizes, decreasing overall calorie intake, 3-5 servings of fruits/vegetables daily, reducing fast food intake, consuming healthier snacks, decreasing soda and/or juice intake, moderation in carbohydrate intake, increasing intake of lean protein, reducing intake of saturated fat and trans fat and reducing intake of cholesterol

## 2020-08-26 LAB
INDURATION: 0 MM
TB SKIN TEST: NEGATIVE

## 2020-08-29 DIAGNOSIS — E03.9 HYPOTHYROIDISM, UNSPECIFIED TYPE: ICD-10-CM

## 2020-08-31 RX ORDER — LEVOTHYROXINE SODIUM 0.05 MG/1
TABLET ORAL
Qty: 30 TABLET | Refills: 5 | Status: SHIPPED | OUTPATIENT
Start: 2020-08-31 | End: 2021-03-10

## 2020-10-01 DIAGNOSIS — F41.9 ANXIETY: ICD-10-CM

## 2020-10-01 RX ORDER — ESCITALOPRAM OXALATE 10 MG/1
TABLET ORAL
Qty: 30 TABLET | Refills: 3 | Status: SHIPPED | OUTPATIENT
Start: 2020-10-01 | End: 2021-02-05

## 2020-11-03 ENCOUNTER — LAB (OUTPATIENT)
Dept: LAB | Facility: HOSPITAL | Age: 35
End: 2020-11-03
Payer: COMMERCIAL

## 2020-11-03 DIAGNOSIS — Z13.6 SCREENING FOR CARDIOVASCULAR CONDITION: ICD-10-CM

## 2020-11-03 DIAGNOSIS — E55.9 VITAMIN D DEFICIENCY: ICD-10-CM

## 2020-11-03 DIAGNOSIS — R39.9 UTI SYMPTOMS: ICD-10-CM

## 2020-11-03 DIAGNOSIS — E03.9 HYPOTHYROIDISM, UNSPECIFIED TYPE: ICD-10-CM

## 2020-11-03 DIAGNOSIS — F41.9 ANXIETY: ICD-10-CM

## 2020-11-03 DIAGNOSIS — R39.9 UTI SYMPTOMS: Primary | ICD-10-CM

## 2020-11-03 DIAGNOSIS — Z00.00 ROUTINE ADULT HEALTH MAINTENANCE: ICD-10-CM

## 2020-11-03 DIAGNOSIS — F41.0 PANIC ATTACKS: ICD-10-CM

## 2020-11-03 LAB
ALBUMIN SERPL BCP-MCNC: 4 G/DL (ref 3.5–5)
ALP SERPL-CCNC: 114 U/L (ref 46–116)
ALT SERPL W P-5'-P-CCNC: 29 U/L (ref 12–78)
ANION GAP SERPL CALCULATED.3IONS-SCNC: 8 MMOL/L (ref 4–13)
AST SERPL W P-5'-P-CCNC: 21 U/L (ref 5–45)
BACTERIA UR QL AUTO: NORMAL /HPF
BASOPHILS # BLD AUTO: 0.07 THOUSANDS/ΜL (ref 0–0.1)
BASOPHILS NFR BLD AUTO: 1 % (ref 0–1)
BILIRUB SERPL-MCNC: 0.3 MG/DL (ref 0.2–1)
BILIRUB UR QL STRIP: NEGATIVE
BUN SERPL-MCNC: 11 MG/DL (ref 5–25)
CALCIUM SERPL-MCNC: 9.1 MG/DL (ref 8.3–10.1)
CHLORIDE SERPL-SCNC: 104 MMOL/L (ref 100–108)
CHOLEST SERPL-MCNC: 165 MG/DL (ref 50–200)
CLARITY UR: CLEAR
CO2 SERPL-SCNC: 24 MMOL/L (ref 21–32)
COLOR UR: YELLOW
CREAT SERPL-MCNC: 0.9 MG/DL (ref 0.6–1.3)
EOSINOPHIL # BLD AUTO: 0.17 THOUSAND/ΜL (ref 0–0.61)
EOSINOPHIL NFR BLD AUTO: 2 % (ref 0–6)
ERYTHROCYTE [DISTWIDTH] IN BLOOD BY AUTOMATED COUNT: 12.5 % (ref 11.6–15.1)
GFR SERPL CREATININE-BSD FRML MDRD: 82 ML/MIN/1.73SQ M
GLUCOSE SERPL-MCNC: 80 MG/DL (ref 65–140)
GLUCOSE UR STRIP-MCNC: NEGATIVE MG/DL
HCT VFR BLD AUTO: 44.3 % (ref 34.8–46.1)
HDLC SERPL-MCNC: 43 MG/DL
HGB BLD-MCNC: 14.1 G/DL (ref 11.5–15.4)
HGB UR QL STRIP.AUTO: ABNORMAL
IMM GRANULOCYTES # BLD AUTO: 0.04 THOUSAND/UL (ref 0–0.2)
IMM GRANULOCYTES NFR BLD AUTO: 0 % (ref 0–2)
KETONES UR STRIP-MCNC: NEGATIVE MG/DL
LDLC SERPL CALC-MCNC: 92 MG/DL (ref 0–100)
LEUKOCYTE ESTERASE UR QL STRIP: NEGATIVE
LYMPHOCYTES # BLD AUTO: 2.66 THOUSANDS/ΜL (ref 0.6–4.47)
LYMPHOCYTES NFR BLD AUTO: 23 % (ref 14–44)
MCH RBC QN AUTO: 32.2 PG (ref 26.8–34.3)
MCHC RBC AUTO-ENTMCNC: 31.8 G/DL (ref 31.4–37.4)
MCV RBC AUTO: 101 FL (ref 82–98)
MONOCYTES # BLD AUTO: 0.68 THOUSAND/ΜL (ref 0.17–1.22)
MONOCYTES NFR BLD AUTO: 6 % (ref 4–12)
NEUTROPHILS # BLD AUTO: 7.86 THOUSANDS/ΜL (ref 1.85–7.62)
NEUTS SEG NFR BLD AUTO: 68 % (ref 43–75)
NITRITE UR QL STRIP: NEGATIVE
NON-SQ EPI CELLS URNS QL MICRO: NORMAL /HPF
NONHDLC SERPL-MCNC: 122 MG/DL
NRBC BLD AUTO-RTO: 0 /100 WBCS
PH UR STRIP.AUTO: 6 [PH]
PLATELET # BLD AUTO: 351 THOUSANDS/UL (ref 149–390)
PMV BLD AUTO: 9 FL (ref 8.9–12.7)
POTASSIUM SERPL-SCNC: 3.1 MMOL/L (ref 3.5–5.3)
PROT SERPL-MCNC: 8.1 G/DL (ref 6.4–8.2)
PROT UR STRIP-MCNC: NEGATIVE MG/DL
RBC # BLD AUTO: 4.38 MILLION/UL (ref 3.81–5.12)
RBC #/AREA URNS AUTO: NORMAL /HPF
SODIUM SERPL-SCNC: 136 MMOL/L (ref 136–145)
SP GR UR STRIP.AUTO: 1.01 (ref 1–1.03)
TRIGL SERPL-MCNC: 151 MG/DL
TSH SERPL DL<=0.05 MIU/L-ACNC: 4.58 UIU/ML (ref 0.36–3.74)
UROBILINOGEN UR QL STRIP.AUTO: 0.2 E.U./DL
WBC # BLD AUTO: 11.48 THOUSAND/UL (ref 4.31–10.16)
WBC #/AREA URNS AUTO: NORMAL /HPF

## 2020-11-03 PROCEDURE — 85025 COMPLETE CBC W/AUTO DIFF WBC: CPT

## 2020-11-03 PROCEDURE — 84443 ASSAY THYROID STIM HORMONE: CPT

## 2020-11-03 PROCEDURE — 84439 ASSAY OF FREE THYROXINE: CPT

## 2020-11-03 PROCEDURE — 81001 URINALYSIS AUTO W/SCOPE: CPT | Performed by: NURSE PRACTITIONER

## 2020-11-03 PROCEDURE — 80061 LIPID PANEL: CPT

## 2020-11-03 PROCEDURE — 82306 VITAMIN D 25 HYDROXY: CPT

## 2020-11-03 PROCEDURE — 36415 COLL VENOUS BLD VENIPUNCTURE: CPT

## 2020-11-03 PROCEDURE — 80053 COMPREHEN METABOLIC PANEL: CPT

## 2020-11-03 PROCEDURE — 87086 URINE CULTURE/COLONY COUNT: CPT

## 2020-11-04 DIAGNOSIS — R39.9 UTI SYMPTOMS: Primary | ICD-10-CM

## 2020-11-04 LAB
25(OH)D3 SERPL-MCNC: 37.1 NG/ML (ref 30–100)
T4 FREE SERPL-MCNC: 0.84 NG/DL (ref 0.76–1.46)

## 2020-11-04 RX ORDER — NITROFURANTOIN 25; 75 MG/1; MG/1
100 CAPSULE ORAL 2 TIMES DAILY
Qty: 14 CAPSULE | Refills: 0 | Status: SHIPPED | OUTPATIENT
Start: 2020-11-04 | End: 2020-11-11

## 2020-11-05 LAB — BACTERIA UR CULT: NORMAL

## 2020-11-14 ENCOUNTER — TRANSCRIBE ORDERS (OUTPATIENT)
Dept: ADMINISTRATIVE | Facility: HOSPITAL | Age: 35
End: 2020-11-14

## 2020-11-14 ENCOUNTER — APPOINTMENT (OUTPATIENT)
Dept: LAB | Facility: HOSPITAL | Age: 35
End: 2020-11-14
Payer: COMMERCIAL

## 2020-11-14 DIAGNOSIS — R10.32 ABDOMINAL PAIN, LEFT LOWER QUADRANT: ICD-10-CM

## 2020-11-14 DIAGNOSIS — K62.5 HEMORRHAGE OF RECTUM AND ANUS: ICD-10-CM

## 2020-11-14 DIAGNOSIS — K62.5 HEMORRHAGE OF RECTUM AND ANUS: Primary | ICD-10-CM

## 2020-11-14 LAB
ANION GAP SERPL CALCULATED.3IONS-SCNC: 11 MMOL/L (ref 4–13)
BASOPHILS # BLD AUTO: 0.04 THOUSANDS/ΜL (ref 0–0.1)
BASOPHILS NFR BLD AUTO: 1 % (ref 0–1)
BUN SERPL-MCNC: 15 MG/DL (ref 5–25)
CALCIUM SERPL-MCNC: 9.1 MG/DL (ref 8.3–10.1)
CHLORIDE SERPL-SCNC: 106 MMOL/L (ref 100–108)
CO2 SERPL-SCNC: 23 MMOL/L (ref 21–32)
CREAT SERPL-MCNC: 1.03 MG/DL (ref 0.6–1.3)
EOSINOPHIL # BLD AUTO: 0.14 THOUSAND/ΜL (ref 0–0.61)
EOSINOPHIL NFR BLD AUTO: 2 % (ref 0–6)
ERYTHROCYTE [DISTWIDTH] IN BLOOD BY AUTOMATED COUNT: 12.6 % (ref 11.6–15.1)
GFR SERPL CREATININE-BSD FRML MDRD: 70 ML/MIN/1.73SQ M
GLUCOSE SERPL-MCNC: 109 MG/DL (ref 65–140)
HCT VFR BLD AUTO: 46.7 % (ref 34.8–46.1)
HGB BLD-MCNC: 14.7 G/DL (ref 11.5–15.4)
IMM GRANULOCYTES # BLD AUTO: 0.02 THOUSAND/UL (ref 0–0.2)
IMM GRANULOCYTES NFR BLD AUTO: 0 % (ref 0–2)
LYMPHOCYTES # BLD AUTO: 1.9 THOUSANDS/ΜL (ref 0.6–4.47)
LYMPHOCYTES NFR BLD AUTO: 22 % (ref 14–44)
MCH RBC QN AUTO: 32.2 PG (ref 26.8–34.3)
MCHC RBC AUTO-ENTMCNC: 31.5 G/DL (ref 31.4–37.4)
MCV RBC AUTO: 102 FL (ref 82–98)
MONOCYTES # BLD AUTO: 0.55 THOUSAND/ΜL (ref 0.17–1.22)
MONOCYTES NFR BLD AUTO: 6 % (ref 4–12)
NEUTROPHILS # BLD AUTO: 6.1 THOUSANDS/ΜL (ref 1.85–7.62)
NEUTS SEG NFR BLD AUTO: 69 % (ref 43–75)
NRBC BLD AUTO-RTO: 0 /100 WBCS
PLATELET # BLD AUTO: 312 THOUSANDS/UL (ref 149–390)
PMV BLD AUTO: 9 FL (ref 8.9–12.7)
POTASSIUM SERPL-SCNC: 3.4 MMOL/L (ref 3.5–5.3)
RBC # BLD AUTO: 4.57 MILLION/UL (ref 3.81–5.12)
SODIUM SERPL-SCNC: 140 MMOL/L (ref 136–145)
WBC # BLD AUTO: 8.75 THOUSAND/UL (ref 4.31–10.16)

## 2020-11-14 PROCEDURE — 36415 COLL VENOUS BLD VENIPUNCTURE: CPT

## 2020-11-14 PROCEDURE — 80048 BASIC METABOLIC PNL TOTAL CA: CPT

## 2020-11-14 PROCEDURE — 85025 COMPLETE CBC W/AUTO DIFF WBC: CPT

## 2021-01-03 DIAGNOSIS — R51.9 FREQUENT HEADACHES: ICD-10-CM

## 2021-01-03 RX ORDER — TOPIRAMATE 100 MG/1
TABLET, FILM COATED ORAL
Qty: 30 TABLET | Refills: 3 | Status: SHIPPED | OUTPATIENT
Start: 2021-01-03 | End: 2021-05-12

## 2021-02-05 DIAGNOSIS — F41.9 ANXIETY: ICD-10-CM

## 2021-02-05 RX ORDER — ESCITALOPRAM OXALATE 10 MG/1
TABLET ORAL
Qty: 30 TABLET | Refills: 3 | Status: SHIPPED | OUTPATIENT
Start: 2021-02-05 | End: 2021-06-18

## 2021-03-08 DIAGNOSIS — J45.20 MILD INTERMITTENT ASTHMA WITHOUT COMPLICATION: ICD-10-CM

## 2021-03-08 DIAGNOSIS — F41.0 PANIC ATTACKS: ICD-10-CM

## 2021-03-08 DIAGNOSIS — F41.9 ANXIETY: ICD-10-CM

## 2021-03-08 DIAGNOSIS — E03.9 HYPOTHYROIDISM, UNSPECIFIED TYPE: Primary | ICD-10-CM

## 2021-03-08 RX ORDER — ALPRAZOLAM 0.25 MG/1
0.25 TABLET ORAL
Qty: 30 TABLET | Refills: 0 | Status: SHIPPED | OUTPATIENT
Start: 2021-03-08

## 2021-03-08 RX ORDER — ALBUTEROL SULFATE 90 UG/1
2 AEROSOL, METERED RESPIRATORY (INHALATION) EVERY 6 HOURS PRN
Qty: 2 INHALER | Refills: 3 | Status: SHIPPED | OUTPATIENT
Start: 2021-03-08

## 2021-03-08 NOTE — TELEPHONE ENCOUNTER
Patient needed med refills  Also was supposed to get TSH done in Jan and forgot  I put orders in for her- she c/o freezing and being tired  Patient wants to make sure xanax is ok to take at bedtime with lexapro  If issue, she is requesting we call her back and let her know

## 2021-03-10 DIAGNOSIS — E03.9 HYPOTHYROIDISM, UNSPECIFIED TYPE: ICD-10-CM

## 2021-03-10 RX ORDER — LEVOTHYROXINE SODIUM 0.05 MG/1
TABLET ORAL
Qty: 30 TABLET | Refills: 5 | Status: SHIPPED | OUTPATIENT
Start: 2021-03-10 | End: 2021-11-04

## 2021-03-17 ENCOUNTER — APPOINTMENT (OUTPATIENT)
Dept: LAB | Facility: HOSPITAL | Age: 36
End: 2021-03-17
Payer: COMMERCIAL

## 2021-03-17 DIAGNOSIS — E03.9 HYPOTHYROIDISM, UNSPECIFIED TYPE: ICD-10-CM

## 2021-03-17 LAB — TSH SERPL DL<=0.05 MIU/L-ACNC: 2.52 UIU/ML (ref 0.36–3.74)

## 2021-03-17 PROCEDURE — 36415 COLL VENOUS BLD VENIPUNCTURE: CPT

## 2021-03-17 PROCEDURE — 84443 ASSAY THYROID STIM HORMONE: CPT

## 2021-05-12 DIAGNOSIS — R51.9 FREQUENT HEADACHES: ICD-10-CM

## 2021-05-12 RX ORDER — TOPIRAMATE 100 MG/1
TABLET, FILM COATED ORAL
Qty: 30 TABLET | Refills: 3 | Status: SHIPPED | OUTPATIENT
Start: 2021-05-12 | End: 2022-01-17

## 2021-06-18 DIAGNOSIS — F41.9 ANXIETY: ICD-10-CM

## 2021-06-18 RX ORDER — ESCITALOPRAM OXALATE 10 MG/1
TABLET ORAL
Qty: 30 TABLET | Refills: 3 | Status: SHIPPED | OUTPATIENT
Start: 2021-06-18 | End: 2022-01-17

## 2021-11-04 DIAGNOSIS — E03.9 HYPOTHYROIDISM, UNSPECIFIED TYPE: ICD-10-CM

## 2021-11-04 RX ORDER — LEVOTHYROXINE SODIUM 0.05 MG/1
TABLET ORAL
Qty: 30 TABLET | Refills: 5 | Status: SHIPPED | OUTPATIENT
Start: 2021-11-04 | End: 2022-06-20 | Stop reason: SDUPTHER

## 2021-12-22 ENCOUNTER — TELEPHONE (OUTPATIENT)
Dept: INTERNAL MEDICINE CLINIC | Facility: CLINIC | Age: 36
End: 2021-12-22

## 2021-12-22 DIAGNOSIS — Z20.822 EXPOSURE TO COVID-19 VIRUS: Primary | ICD-10-CM

## 2021-12-22 PROCEDURE — U0005 INFEC AGEN DETEC AMPLI PROBE: HCPCS | Performed by: NURSE PRACTITIONER

## 2021-12-22 PROCEDURE — U0003 INFECTIOUS AGENT DETECTION BY NUCLEIC ACID (DNA OR RNA); SEVERE ACUTE RESPIRATORY SYNDROME CORONAVIRUS 2 (SARS-COV-2) (CORONAVIRUS DISEASE [COVID-19]), AMPLIFIED PROBE TECHNIQUE, MAKING USE OF HIGH THROUGHPUT TECHNOLOGIES AS DESCRIBED BY CMS-2020-01-R: HCPCS | Performed by: NURSE PRACTITIONER

## 2021-12-23 ENCOUNTER — TELEPHONE (OUTPATIENT)
Dept: INTERNAL MEDICINE CLINIC | Facility: CLINIC | Age: 36
End: 2021-12-23

## 2021-12-29 ENCOUNTER — TELEPHONE (OUTPATIENT)
Dept: INTERNAL MEDICINE CLINIC | Facility: CLINIC | Age: 36
End: 2021-12-29

## 2021-12-29 DIAGNOSIS — M79.89 SWELLING OF BOTH HANDS: Primary | ICD-10-CM

## 2022-01-04 ENCOUNTER — APPOINTMENT (OUTPATIENT)
Dept: LAB | Facility: HOSPITAL | Age: 37
End: 2022-01-04
Payer: COMMERCIAL

## 2022-01-04 DIAGNOSIS — R39.9 UTI SYMPTOMS: Primary | ICD-10-CM

## 2022-01-04 DIAGNOSIS — R39.9 UTI SYMPTOMS: ICD-10-CM

## 2022-01-04 DIAGNOSIS — M79.89 SWELLING OF BOTH HANDS: ICD-10-CM

## 2022-01-04 LAB
ALBUMIN SERPL BCP-MCNC: 4 G/DL (ref 3.5–5)
ALP SERPL-CCNC: 98 U/L (ref 46–116)
ALT SERPL W P-5'-P-CCNC: 65 U/L (ref 12–78)
ANION GAP SERPL CALCULATED.3IONS-SCNC: 13 MMOL/L (ref 4–13)
AST SERPL W P-5'-P-CCNC: 31 U/L (ref 5–45)
BACTERIA UR QL AUTO: ABNORMAL /HPF
BASOPHILS # BLD AUTO: 0.05 THOUSANDS/ΜL (ref 0–0.1)
BASOPHILS NFR BLD AUTO: 1 % (ref 0–1)
BILIRUB SERPL-MCNC: 0.64 MG/DL (ref 0.2–1)
BILIRUB UR QL STRIP: NEGATIVE
BUN SERPL-MCNC: 17 MG/DL (ref 5–25)
CALCIUM SERPL-MCNC: 8.9 MG/DL (ref 8.3–10.1)
CHLORIDE SERPL-SCNC: 104 MMOL/L (ref 100–108)
CLARITY UR: ABNORMAL
CO2 SERPL-SCNC: 24 MMOL/L (ref 21–32)
COLOR UR: ABNORMAL
CREAT SERPL-MCNC: 0.92 MG/DL (ref 0.6–1.3)
CRP SERPL QL: 21.3 MG/L
EOSINOPHIL # BLD AUTO: 0.35 THOUSAND/ΜL (ref 0–0.61)
EOSINOPHIL NFR BLD AUTO: 4 % (ref 0–6)
ERYTHROCYTE [DISTWIDTH] IN BLOOD BY AUTOMATED COUNT: 12.5 % (ref 11.6–15.1)
ERYTHROCYTE [SEDIMENTATION RATE] IN BLOOD: 28 MM/HOUR (ref 0–19)
GFR SERPL CREATININE-BSD FRML MDRD: 79 ML/MIN/1.73SQ M
GLUCOSE P FAST SERPL-MCNC: 109 MG/DL (ref 65–99)
GLUCOSE UR STRIP-MCNC: NEGATIVE MG/DL
HCT VFR BLD AUTO: 47.1 % (ref 34.8–46.1)
HGB BLD-MCNC: 14.9 G/DL (ref 11.5–15.4)
HGB UR QL STRIP.AUTO: ABNORMAL
IMM GRANULOCYTES # BLD AUTO: 0.02 THOUSAND/UL (ref 0–0.2)
IMM GRANULOCYTES NFR BLD AUTO: 0 % (ref 0–2)
KETONES UR STRIP-MCNC: NEGATIVE MG/DL
LEUKOCYTE ESTERASE UR QL STRIP: ABNORMAL
LYMPHOCYTES # BLD AUTO: 2.27 THOUSANDS/ΜL (ref 0.6–4.47)
LYMPHOCYTES NFR BLD AUTO: 24 % (ref 14–44)
MCH RBC QN AUTO: 31.9 PG (ref 26.8–34.3)
MCHC RBC AUTO-ENTMCNC: 31.6 G/DL (ref 31.4–37.4)
MCV RBC AUTO: 101 FL (ref 82–98)
MONOCYTES # BLD AUTO: 0.81 THOUSAND/ΜL (ref 0.17–1.22)
MONOCYTES NFR BLD AUTO: 9 % (ref 4–12)
NEUTROPHILS # BLD AUTO: 5.8 THOUSANDS/ΜL (ref 1.85–7.62)
NEUTS SEG NFR BLD AUTO: 62 % (ref 43–75)
NITRITE UR QL STRIP: NEGATIVE
NON-SQ EPI CELLS URNS QL MICRO: ABNORMAL /HPF
NRBC BLD AUTO-RTO: 0 /100 WBCS
PH UR STRIP.AUTO: 6 [PH]
PLATELET # BLD AUTO: 308 THOUSANDS/UL (ref 149–390)
PMV BLD AUTO: 9.4 FL (ref 8.9–12.7)
POTASSIUM SERPL-SCNC: 3.9 MMOL/L (ref 3.5–5.3)
PROT SERPL-MCNC: 8 G/DL (ref 6.4–8.2)
PROT UR STRIP-MCNC: NEGATIVE MG/DL
RBC # BLD AUTO: 4.67 MILLION/UL (ref 3.81–5.12)
RBC #/AREA URNS AUTO: ABNORMAL /HPF
SODIUM SERPL-SCNC: 141 MMOL/L (ref 136–145)
SP GR UR STRIP.AUTO: <=1.005 (ref 1–1.03)
T4 FREE SERPL-MCNC: 1.08 NG/DL (ref 0.76–1.46)
TSH SERPL DL<=0.05 MIU/L-ACNC: 4.04 UIU/ML (ref 0.36–3.74)
UROBILINOGEN UR QL STRIP.AUTO: 0.2 E.U./DL
WBC # BLD AUTO: 9.3 THOUSAND/UL (ref 4.31–10.16)
WBC #/AREA URNS AUTO: ABNORMAL /HPF

## 2022-01-04 PROCEDURE — 87086 URINE CULTURE/COLONY COUNT: CPT

## 2022-01-04 PROCEDURE — 86140 C-REACTIVE PROTEIN: CPT

## 2022-01-04 PROCEDURE — 85652 RBC SED RATE AUTOMATED: CPT

## 2022-01-04 PROCEDURE — 80053 COMPREHEN METABOLIC PANEL: CPT

## 2022-01-04 PROCEDURE — 87077 CULTURE AEROBIC IDENTIFY: CPT

## 2022-01-04 PROCEDURE — 84439 ASSAY OF FREE THYROXINE: CPT

## 2022-01-04 PROCEDURE — 84443 ASSAY THYROID STIM HORMONE: CPT

## 2022-01-04 PROCEDURE — 87186 SC STD MICRODIL/AGAR DIL: CPT

## 2022-01-04 PROCEDURE — 81001 URINALYSIS AUTO W/SCOPE: CPT

## 2022-01-04 PROCEDURE — 36415 COLL VENOUS BLD VENIPUNCTURE: CPT

## 2022-01-04 PROCEDURE — 85025 COMPLETE CBC W/AUTO DIFF WBC: CPT

## 2022-01-06 LAB — BACTERIA UR CULT: ABNORMAL

## 2022-01-07 DIAGNOSIS — N30.00 ACUTE CYSTITIS WITHOUT HEMATURIA: Primary | ICD-10-CM

## 2022-01-07 RX ORDER — NITROFURANTOIN 25; 75 MG/1; MG/1
100 CAPSULE ORAL 2 TIMES DAILY
Qty: 10 CAPSULE | Refills: 0 | Status: SHIPPED | OUTPATIENT
Start: 2022-01-07 | End: 2022-01-12

## 2022-01-16 DIAGNOSIS — R51.9 FREQUENT HEADACHES: ICD-10-CM

## 2022-01-16 DIAGNOSIS — F41.9 ANXIETY: ICD-10-CM

## 2022-01-17 RX ORDER — TOPIRAMATE 100 MG/1
TABLET, FILM COATED ORAL
Qty: 30 TABLET | Refills: 3 | Status: SHIPPED | OUTPATIENT
Start: 2022-01-17

## 2022-01-17 RX ORDER — ESCITALOPRAM OXALATE 10 MG/1
TABLET ORAL
Qty: 30 TABLET | Refills: 3 | Status: SHIPPED | OUTPATIENT
Start: 2022-01-17 | End: 2022-06-20 | Stop reason: SDUPTHER

## 2022-01-19 ENCOUNTER — TELEPHONE (OUTPATIENT)
Dept: INTERNAL MEDICINE CLINIC | Facility: CLINIC | Age: 37
End: 2022-01-19

## 2022-01-19 NOTE — TELEPHONE ENCOUNTER
Khloe Oropeza called with complaints that her UTI was back  She c/o burning, cruz pain, pressure and weak flow  As Per Dr Ana Diaz can start her on another antibiotic  Patient uses MELANY vazquez, does not have preference for bactrim or cipro- whatever would work better is fine  Patient advised if this does not work she should contact GYN and may need further testing

## 2022-02-01 ENCOUNTER — OFFICE VISIT (OUTPATIENT)
Dept: RHEUMATOLOGY | Facility: CLINIC | Age: 37
End: 2022-02-01
Payer: COMMERCIAL

## 2022-02-01 VITALS
BODY MASS INDEX: 31.41 KG/M2 | WEIGHT: 184 LBS | SYSTOLIC BLOOD PRESSURE: 123 MMHG | HEART RATE: 76 BPM | HEIGHT: 64 IN | DIASTOLIC BLOOD PRESSURE: 78 MMHG

## 2022-02-01 DIAGNOSIS — R79.82 ELEVATED C-REACTIVE PROTEIN: ICD-10-CM

## 2022-02-01 DIAGNOSIS — R70.0 ELEVATED SED RATE: ICD-10-CM

## 2022-02-01 DIAGNOSIS — M19.90 ARTHRITIS: Primary | ICD-10-CM

## 2022-02-01 PROCEDURE — 99204 OFFICE O/P NEW MOD 45 MIN: CPT | Performed by: INTERNAL MEDICINE

## 2022-02-01 PROCEDURE — 3008F BODY MASS INDEX DOCD: CPT | Performed by: INTERNAL MEDICINE

## 2022-02-01 RX ORDER — NAPROXEN 500 MG/1
500 TABLET ORAL 2 TIMES DAILY WITH MEALS
Qty: 60 TABLET | Refills: 3 | Status: SHIPPED | OUTPATIENT
Start: 2022-02-01 | End: 2022-06-24 | Stop reason: SDUPTHER

## 2022-02-01 NOTE — PROGRESS NOTES
Assessment and Plan:   Peace Ma is a 43 y o   female who presents as a Rheumatology consult referred by Jose Leung  for evaluation of joint pain and elevated ESR, CRP  Patient had her baby 5 month ago  She has progressive pain in her knees for years that was getting worse for the past 6 month  She noticed hand swelling for the past few months  She also noticed numbness tingling in her hands  Her ESR was 28 and CRP 21 3  Patient had UTI at the same time, so can explain the elevated inflammatory markers  Will repeat inflammatory markers and do specific test for RA  Will do baseline imaging of the hands and knees  Patient takes ibuprofen 4 tablets up to 4-6 times a day with good effect, however the dose appears to be too high  I advised to use naproxen 500 mg bid instead  Do labs  Do hand and knee x-rays  Stop ibuprofen, start naproxen twice a day with meals as needed for joint pain  Try diclofenac (Voltaren) gel as needed for joint pain    Return to clinic in 3 months    Plan:  Diagnoses and all orders for this visit:    Arthritis  -     naproxen (NAPROSYN) 500 mg tablet; Take 1 tablet (500 mg total) by mouth 2 (two) times a day with meals  -     Sedimentation rate, automated  -     C-reactive protein  -     Cyclic citrul peptide antibody, IgG  -     RF Screen w/ Reflex to Titer  -     XR hand 3+ vw left; Future  -     XR hand 3+ vw right; Future  -     Vitamin D 25 hydroxy  -     XR knee 3 vw left non injury; Future  -     XR knee 3 vw right non injury; Future    Elevated sed rate    Elevated C-reactive protein    Follow-up plan: RTC in 3 months        HPI  Peace Ma is a 43 y o   female who presents as a Rheumatology consult referred by JATINDER Andersen for evaluation of joint pains  Patient reports she has pain in her knees which is worse to the end of the day   She has stiffness that is usually worse in the morning and after rest  Patient denies swelling redness warmth in her joints  She has chronic low back pain and was seeing chiropractic in th past  She had spine Xray in 2020 that was normal  For the past couple month  She ha had hand swelling, hand stiffness and sometimes tingling and numbness as well  She has dry skin, she had eczema as a child  Patient denies discoloration of fingers  She noticed dryness in her mouth  She deines rashes, eye dryness or pain, chest pain, SOB, N/V, abdominal pain  Patient does not have family history of RA or other rheumatoid diseases  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  Her UTI symptoms have completely resolved  Patient smokes 1/2 PPD of cigarettes  Review of Systems  Review of Systems   Constitutional: Positive for fatigue  Negative for chills and fever  HENT: Positive for sinus pressure and sinus pain  Negative for congestion, hearing loss, mouth sores and trouble swallowing  Eyes: Negative  Negative for redness  Respiratory: Positive for shortness of breath and wheezing  Negative for cough and chest tightness  Cardiovascular: Negative for chest pain, palpitations and leg swelling  Gastrointestinal: Negative for abdominal pain, blood in stool, constipation, diarrhea and nausea  Endocrine: Positive for cold intolerance, heat intolerance and polydipsia  Genitourinary: Positive for dysuria and frequency  Negative for flank pain and hematuria  Musculoskeletal: Positive for arthralgias, back pain, joint swelling, myalgias and neck pain  Skin: Negative for pallor and rash  Allergic/Immunologic: Negative  Neurological: Positive for headaches  Negative for dizziness and numbness  Hematological: Negative  Psychiatric/Behavioral: The patient is not nervous/anxious  Reviewed and agree      Allergies  Allergies   Allergen Reactions    Cat Hair Extract     Dog Epithelium     Nuts - Food Allergy     Pollen Extract        Home Medications    Current Outpatient Medications:     albuterol (ProAir HFA) 90 mcg/act inhaler, Inhale 2 puffs every 6 (six) hours as needed for shortness of breath, Disp: 2 Inhaler, Rfl: 3    ALPRAZolam (XANAX) 0 25 mg tablet, Take 1 tablet (0 25 mg total) by mouth daily at bedtime as needed for anxiety, Disp: 30 tablet, Rfl: 0    betamethasone dipropionate (DIPROSONE) 0 05 % cream, Apply topically 2 (two) times a day, Disp: 45 g, Rfl: 3    Blood Pressure Monitoring (BLOOD PRESSURE MONITOR/WRIST) KIT, 1 Package by Other route 2 (two) times a day, Disp: 1 kit, Rfl: 0    Cholecalciferol (VITAMIN D3) 58894 units TABS, Take one tablet by mouth once weekly, Disp: 4 tablet, Rfl: 3    escitalopram (LEXAPRO) 10 mg tablet, take 1 tablet by mouth once daily if needed for anxiety, Disp: 30 tablet, Rfl: 3    levonorgestrel (MIRENA) 20 MCG/24HR IUD, 1 each by Intrauterine route, Disp: , Rfl:     levothyroxine 50 mcg tablet, take 1 tablet by mouth once daily, Disp: 30 tablet, Rfl: 5    topiramate (TOPAMAX) 100 mg tablet, take 1 tablet by mouth once daily, Disp: 30 tablet, Rfl: 3    triamcinolone (KENALOG) 0 1 % cream, Apply topically 2 (two) times a day, Disp: 453 6 g, Rfl: 1    naproxen (NAPROSYN) 500 mg tablet, Take 1 tablet (500 mg total) by mouth 2 (two) times a day with meals, Disp: 60 tablet, Rfl: 3    Past Medical History  History reviewed  No pertinent past medical history  Past Surgical History   History reviewed  No pertinent surgical history  Family History    Family History   Problem Relation Age of Onset   [facility] Cancer Sister     Diabetes Paternal Grandfather      No known family history of autoimmune or inflammatory diseases      Social History  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  Social History     Substance and Sexual Activity   Alcohol Use No     Social History     Substance and Sexual Activity   Drug Use No     Social History     Tobacco Use   Smoking Status Current Every Day Smoker    Types: Cigarettes   Smokeless Tobacco Never Used       Objective:  Vitals:    02/01/22 0832   BP: 123/78   Pulse: 76   Weight: 83 5 kg (184 lb)   Height: 5' 4" (1 626 m)       Physical Exam  Vitals reviewed  Constitutional:       Appearance: Normal appearance  HENT:      Head: Normocephalic and atraumatic  Nose: Nose normal       Mouth/Throat:      Mouth: Mucous membranes are moist    Eyes:      General: No scleral icterus  Extraocular Movements: Extraocular movements intact  Conjunctiva/sclera: Conjunctivae normal    Cardiovascular:      Rate and Rhythm: Normal rate and regular rhythm  Pulses: Normal pulses  Heart sounds: Normal heart sounds  No murmur heard  No gallop  Pulmonary:      Effort: Pulmonary effort is normal  No respiratory distress  Breath sounds: Normal breath sounds  No wheezing or rales  Musculoskeletal:         General: No swelling or tenderness  Skin:     General: Skin is warm  Coloration: Skin is not jaundiced  Findings: No erythema or rash  Neurological:      General: No focal deficit present  Mental Status: She is alert and oriented to person, place, and time  Psychiatric:         Mood and Affect: Mood normal          Behavior: Behavior normal        Reviewed labs and imaging  Imaging:   XR cervical spine 1/31/20  Unremarkable cervical spine      Labs:   Appointment on 01/04/2022   Component Date Value Ref Range Status    Sodium 01/04/2022 141  136 - 145 mmol/L Final    Potassium 01/04/2022 3 9  3 5 - 5 3 mmol/L Final    Chloride 01/04/2022 104  100 - 108 mmol/L Final    CO2 01/04/2022 24  21 - 32 mmol/L Final    ANION GAP 01/04/2022 13  4 - 13 mmol/L Final    BUN 01/04/2022 17  5 - 25 mg/dL Final    Creatinine 01/04/2022 0 92  0 60 - 1 30 mg/dL Final    Standardized to IDMS reference method    Glucose, Fasting 01/04/2022 109* 65 - 99 mg/dL Final    Specimen collection should occur prior to Sulfasalazine administration due to the potential for falsely depressed results  Specimen collection should occur prior to Sulfapyridine administration due to the potential for falsely elevated results   Calcium 01/04/2022 8 9  8 3 - 10 1 mg/dL Final    AST 01/04/2022 31  5 - 45 U/L Final    Specimen collection should occur prior to Sulfasalazine administration due to the potential for falsely depressed results   ALT 01/04/2022 65  12 - 78 U/L Final    Specimen collection should occur prior to Sulfasalazine administration due to the potential for falsely depressed results   Alkaline Phosphatase 01/04/2022 98  46 - 116 U/L Final    Total Protein 01/04/2022 8 0  6 4 - 8 2 g/dL Final    Albumin 01/04/2022 4 0  3 5 - 5 0 g/dL Final    Total Bilirubin 01/04/2022 0 64  0 20 - 1 00 mg/dL Final    Use of this assay is not recommended for patients undergoing treatment with eltrombopag due to the potential for falsely elevated results      eGFR 01/04/2022 79  ml/min/1 73sq m Final    WBC 01/04/2022 9 30  4 31 - 10 16 Thousand/uL Final    RBC 01/04/2022 4 67  3 81 - 5 12 Million/uL Final    Hemoglobin 01/04/2022 14 9  11 5 - 15 4 g/dL Final    Hematocrit 01/04/2022 47 1* 34 8 - 46 1 % Final    MCV 01/04/2022 101* 82 - 98 fL Final    MCH 01/04/2022 31 9  26 8 - 34 3 pg Final    MCHC 01/04/2022 31 6  31 4 - 37 4 g/dL Final    RDW 01/04/2022 12 5  11 6 - 15 1 % Final    MPV 01/04/2022 9 4  8 9 - 12 7 fL Final    Platelets 64/80/8015 308  149 - 390 Thousands/uL Final    nRBC 01/04/2022 0  /100 WBCs Final    Neutrophils Relative 01/04/2022 62  43 - 75 % Final    Immat GRANS % 01/04/2022 0  0 - 2 % Final    Lymphocytes Relative 01/04/2022 24  14 - 44 % Final    Monocytes Relative 01/04/2022 9  4 - 12 % Final    Eosinophils Relative 01/04/2022 4  0 - 6 % Final    Basophils Relative 01/04/2022 1  0 - 1 % Final    Neutrophils Absolute 01/04/2022 5 80  1 85 - 7 62 Thousands/µL Final    Immature Grans Absolute 01/04/2022 0 02  0 00 - 0 20 Thousand/uL Final    Lymphocytes Absolute 01/04/2022 2 27  0 60 - 4 47 Thousands/µL Final    Monocytes Absolute 01/04/2022 0 81  0 17 - 1 22 Thousand/µL Final    Eosinophils Absolute 01/04/2022 0 35  0 00 - 0 61 Thousand/µL Final    Basophils Absolute 01/04/2022 0 05  0 00 - 0 10 Thousands/µL Final    TSH 3RD GENERATON 01/04/2022 4 040* 0 358 - 3 740 uIU/mL Final    The recommended reference ranges for TSH during pregnancy are as follows:   First trimester 0 1 to 2 5 uIU/mL   Second trimester  0 2 to 3 0 uIU/mL   Third trimester 0 3 to 3 0 uIU/m    Note: Normal ranges may not apply to patients who are transgender, non-binary, or whose legal sex, sex at birth, and gender identity differ   CRP 01/04/2022 21 3* <3 0 mg/L Final    Sed Rate 01/04/2022 28* 0 - 19 mm/hour Final    Free T4 01/04/2022 1 08  0 76 - 1 46 ng/dL Final    Specimen collection should occur prior to Sulfasalazine administration due to the potential for falsely elevated results      Color, UA 01/04/2022 Light Yellow   Final    Clarity, UA 01/04/2022 Slightly Cloudy   Final    Specific Gravity, UA 01/04/2022 <=1 005  1 003 - 1 030 Final    pH, UA 01/04/2022 6 0  4 5, 5 0, 5 5, 6 0, 6 5, 7 0, 7 5, 8 0 Final    Leukocytes, UA 01/04/2022 Moderate* Negative Final    Nitrite, UA 01/04/2022 Negative  Negative Final    Protein, UA 01/04/2022 Negative  Negative mg/dl Final    Glucose, UA 01/04/2022 Negative  Negative mg/dl Final    Ketones, UA 01/04/2022 Negative  Negative mg/dl Final    Urobilinogen, UA 01/04/2022 0 2  0 2, 1 0 E U /dl E U /dl Final    Bilirubin, UA 01/04/2022 Negative  Negative Final    Blood, UA 01/04/2022 Small* Negative Final    RBC, UA 01/04/2022 1-2  None Seen, 0-1, 1-2, 2-4, 0-5 /hpf Final    WBC, UA 01/04/2022 10-20* None Seen, 0-1, 1-2, 0-5, 2-4 /hpf Final    Epithelial Cells 01/04/2022 Occasional  None Seen, Occasional /hpf Final    Bacteria, UA 01/04/2022 Occasional  None Seen, Occasional /hpf Final    Urine Culture 01/04/2022 40,000-49,000 cfu/ml Escherichia coli*  Final   Orders Only on 12/22/2021   Component Date Value Ref Range Status    SARS-CoV-2 12/22/2021 Negative  Negative Final

## 2022-02-01 NOTE — PATIENT INSTRUCTIONS
Do labs  Do hand and knee x-rays  Stop ibuprofen, start naproxen twice a day with meals as needed for joint pain  Try diclofenac (Voltaren) gel as needed for joint pain    Return to clinic in 3 months    Arthritis   AMBULATORY CARE:   Arthritis  is pain or disease in one or more joints  There are many types of arthritis  Types such as rheumatoid arthritis cause inflammation in the joints  Other types wear away the cartilage between joints, such as osteoarthritis  This makes the bones of the joint rub together when you move the joint  An infection from bacteria, a virus, or a fungus can also cause arthritis  Your symptoms may be constant, or symptoms may come and go  Arthritis often gets worse over time and can cause permanent joint damage  Common signs and symptoms of arthritis:   · Pain, swelling, or stiffness in the joint    · Limited range of motion in the joint    · Warmth or redness over the joint    · Tenderness when you touch the joint    · Stiff joints in the morning that loosen with movement    · A creaking or grinding sound when you move the joint    · Fever    Call your doctor or rheumatologist if:   · You have a fever and severe joint pain or swelling  · You cannot move the affected joint  · You have severe joint pain you cannot tolerate  · You have a new or worsening rash  · Your pain or swelling does not get better with treatment  · You have questions or concerns about your condition or care  Treatment  will depend on the type of arthritis you have and if it is severe  You may need any of the following:  · Acetaminophen  decreases pain and fever  It is available without a doctor's order  Ask how much to take and how often to take it  Follow directions  Read the labels of all other medicines you are using to see if they also contain acetaminophen, or ask your doctor or pharmacist  Acetaminophen can cause liver damage if not taken correctly   Do not use more than 4 grams (4,000 milligrams) total of acetaminophen in one day  · NSAIDs , such as ibuprofen, help decrease swelling, pain, and fever  This medicine is available with or without a doctor's order  NSAIDs can cause stomach bleeding or kidney problems in certain people  If you take blood thinner medicine, always ask your healthcare provider if NSAIDs are safe for you  Always read the medicine label and follow directions  · Steroid medicine  helps reduce swelling and pain  · Prescription pain medicine  may be given  Ask your healthcare provider how to take this medicine safely  Some prescription pain medicines contain acetaminophen  Do not take other medicines that contain acetaminophen without talking to your healthcare provider  Too much acetaminophen may cause liver damage  Prescription pain medicine may cause constipation  Ask your healthcare provider how to prevent or treat constipation  · Surgery  may be needed to repair or replace a damaged joint  Manage your symptoms:   · Rest your painful joint so it can heal   Your healthcare provider may recommend crutches or a walker if the affected joint is in a leg  · Apply ice or heat to the joint  Both can help decrease swelling and pain  Ice may also help prevent tissue damage  Use an ice pack, or put crushed ice in a plastic bag  Cover it with a towel and place it on your joint for 15 to 20 minutes every hour or as directed  You can apply heat for 20 minutes every 2 hours  Heat treatment includes hot packs or heat lamps  · Elevate your joint  Elevation helps reduce swelling and pain  Raise your joint above the level of your heart as often as you can  Prop your painful joint on pillows to keep it above your heart comfortably  Manage arthritis:   · Talk to your healthcare providers about your arthritis medicines  Some medicines may only be needed when you have arthritis pain   You may need to take other medicines every day to prevent arthritis from getting worse  Your healthcare providers will help you understand all your medicines and when to take them  It is important to take the medicines as directed, even if you start to feel better  You can continue to have joint damage and inflammation even if you do not feel it  · Eat a variety of healthy foods  Healthy foods include fruits, vegetables, whole-grain breads, low-fat dairy products, beans, lean meats, and fish  Ask if you need to be on a special diet  A diet rich in calcium and vitamin D may decrease your risk of osteoporosis  Foods high in calcium include milk, cheese, broccoli, and tofu  Vitamin D may be found in meat, fish, fortified milk, cereal and bread  Ask if you need calcium or vitamin D supplements  · Go to physical or occupational therapy as directed  A physical therapist can teach you exercises to improve flexibility and range of motion  You may also be shown non-weight-bearing exercises that are safe for your joints, such as swimming  Exercise can help keep your joints flexible and reduce pain  An occupational therapist can help you learn to do your daily activities when your joints are stiff or sore  · Maintain a healthy weight  Extra weight puts increased pressure on your joints  Ask your healthcare provider what you should weigh  If you need to lose weight, he or she can help you create a weight loss program  Weight loss can help reduce pain and increase your ability to do your activities  · Wear flat or low-heeled shoes  This will help decrease pain and reduce pressure on your ankle, knee, and hip joints  · Do not smoke  Nicotine and other chemicals in cigarettes and cigars can damage your bones and joints  Ask your healthcare provider for information if you currently smoke and need help to quit  E-cigarettes or smokeless tobacco still contain nicotine  Talk to your healthcare provider before you use these products      Support devices:   · Orthotic shoes or insoles help support your feet when you walk  · Crutches, a cane, or a walker  may help decrease your risk for falling  They also decrease stress on affected joints  · Devices to prevent falls  include raised toilet seats and bathtub bars to help you get up from sitting  Handrails can be placed in areas where you need balance and support  · Devices to help with support and rest  include splints to wear on your hands and a firm pillow while you sleep  Use a pillow that is firm enough to support your neck and head  Follow up with your healthcare provider or rheumatologist as directed:  Write down your questions so you remember to ask them during your visits  © Copyright [employer] 2021 Information is for End User's use only and may not be sold, redistributed or otherwise used for commercial purposes  All illustrations and images included in CareNotes® are the copyrighted property of [employer]  or [employer]  The above information is an  only  It is not intended as medical advice for individual conditions or treatments  Talk to your doctor, nurse or pharmacist before following any medical regimen to see if it is safe and effective for you

## 2022-02-12 ENCOUNTER — HOSPITAL ENCOUNTER (OUTPATIENT)
Dept: RADIOLOGY | Facility: HOSPITAL | Age: 37
Discharge: HOME | End: 2022-02-12
Attending: INTERNAL MEDICINE
Payer: COMMERCIAL

## 2022-02-12 ENCOUNTER — APPOINTMENT (OUTPATIENT)
Dept: LAB | Facility: HOSPITAL | Age: 37
End: 2022-02-12
Attending: INTERNAL MEDICINE
Payer: COMMERCIAL

## 2022-02-12 DIAGNOSIS — M19.90 ARTHRITIS: ICD-10-CM

## 2022-02-12 LAB
25(OH)D3 SERPL-MCNC: 18.5 NG/ML (ref 30–100)
CRP SERPL QL: 0.8 MG/L
ERYTHROCYTE [SEDIMENTATION RATE] IN BLOOD: 19 MM/HOUR (ref 0–19)

## 2022-02-12 PROCEDURE — 73562 X-RAY EXAM OF KNEE 3: CPT

## 2022-02-12 PROCEDURE — 36415 COLL VENOUS BLD VENIPUNCTURE: CPT | Performed by: INTERNAL MEDICINE

## 2022-02-12 PROCEDURE — 82306 VITAMIN D 25 HYDROXY: CPT | Performed by: INTERNAL MEDICINE

## 2022-02-12 PROCEDURE — 86200 CCP ANTIBODY: CPT | Performed by: INTERNAL MEDICINE

## 2022-02-12 PROCEDURE — 86430 RHEUMATOID FACTOR TEST QUAL: CPT | Performed by: INTERNAL MEDICINE

## 2022-02-12 PROCEDURE — 73130 X-RAY EXAM OF HAND: CPT

## 2022-02-12 PROCEDURE — 86140 C-REACTIVE PROTEIN: CPT | Performed by: INTERNAL MEDICINE

## 2022-02-12 PROCEDURE — 85652 RBC SED RATE AUTOMATED: CPT | Performed by: INTERNAL MEDICINE

## 2022-02-13 LAB — RHEUMATOID FACT SER QL LA: NEGATIVE

## 2022-02-15 LAB — CCP AB SER IA-ACNC: 2.4

## 2022-03-28 DIAGNOSIS — E03.9 HYPOTHYROIDISM, UNSPECIFIED TYPE: Primary | ICD-10-CM

## 2022-06-20 ENCOUNTER — OFFICE VISIT (OUTPATIENT)
Dept: INTERNAL MEDICINE CLINIC | Facility: CLINIC | Age: 37
End: 2022-06-20
Payer: COMMERCIAL

## 2022-06-20 VITALS — TEMPERATURE: 98.5 F | OXYGEN SATURATION: 98 % | HEART RATE: 74 BPM

## 2022-06-20 DIAGNOSIS — E03.9 HYPOTHYROIDISM, UNSPECIFIED TYPE: ICD-10-CM

## 2022-06-20 DIAGNOSIS — F41.9 ANXIETY: ICD-10-CM

## 2022-06-20 DIAGNOSIS — J40 TRACHEOBRONCHITIS: Primary | ICD-10-CM

## 2022-06-20 DIAGNOSIS — E55.9 VITAMIN D DEFICIENCY: ICD-10-CM

## 2022-06-20 PROBLEM — N71.9 ENDOMETRITIS: Status: ACTIVE | Noted: 2020-11-27

## 2022-06-20 PROBLEM — T83.32XA IUD STRINGS LOST: Status: ACTIVE | Noted: 2020-11-27

## 2022-06-20 PROCEDURE — 99213 OFFICE O/P EST LOW 20 MIN: CPT | Performed by: FAMILY MEDICINE

## 2022-06-20 RX ORDER — ESCITALOPRAM OXALATE 10 MG/1
10 TABLET ORAL DAILY
Qty: 30 TABLET | Refills: 2 | Status: SHIPPED | OUTPATIENT
Start: 2022-06-20

## 2022-06-20 RX ORDER — LEVOTHYROXINE SODIUM 0.05 MG/1
50 TABLET ORAL DAILY
Qty: 30 TABLET | Refills: 2 | Status: SHIPPED | OUTPATIENT
Start: 2022-06-20

## 2022-06-20 RX ORDER — METHYLPREDNISOLONE 4 MG/1
TABLET ORAL
Qty: 21 EACH | Refills: 0 | Status: SHIPPED | OUTPATIENT
Start: 2022-06-20

## 2022-06-20 RX ORDER — AMOXICILLIN AND CLAVULANATE POTASSIUM 875; 125 MG/1; MG/1
1 TABLET, FILM COATED ORAL 2 TIMES DAILY
Qty: 20 TABLET | Refills: 0 | Status: SHIPPED | OUTPATIENT
Start: 2022-06-20 | End: 2022-06-30

## 2022-06-20 NOTE — PROGRESS NOTES
Assessment/Plan:    No problem-specific Assessment & Plan notes found for this encounter  Diagnoses and all orders for this visit:    Tracheobronchitis  -     amoxicillin-clavulanate (AUGMENTIN) 875-125 mg per tablet; Take 1 tablet by mouth 2 (two) times a day for 10 days  -     methylPREDNISolone 4 MG tablet therapy pack; Use as directed on package  -     CBC and differential; Future  -     Comprehensive metabolic panel; Future    Anxiety  -     escitalopram (LEXAPRO) 10 mg tablet; Take 1 tablet (10 mg total) by mouth daily  -     CBC and differential; Future  -     Comprehensive metabolic panel; Future    Hypothyroidism, unspecified type  -     levothyroxine 50 mcg tablet; Take 1 tablet (50 mcg total) by mouth daily  -     CBC and differential; Future  -     Comprehensive metabolic panel; Future  -     Lipid panel; Future  -     TSH, 3rd generation; Future    Vitamin D deficiency  -     CBC and differential; Future  -     Comprehensive metabolic panel; Future  -     Vitamin D 25 hydroxy; Future    orders and recommendations as noted above  Fluids  Rest   Tylenol or Motrin if needed  Discussed with her symptoms  Augmentin as noted above  Take with food  Medrol Dosepak as noted above  Watch for any worsening of symptoms  Call or follow-up if symptoms worsen or persist   Discussed with her setting up a regular follow-up appointment since it has been quite awhile  Refills given on her chronic medications  Slip given for laboratory testing for her to have completed prior to her regular appointment  Subjective:      Patient ID: Shaina Zambrano is a 42 y o  female  She presents for acute visit  She started close to a week ago with some nasal congestion and cough  Has had worsening symptoms with increasing sinus pressure and significant sore throat  Ears have been popping and has had some pressure into them  Denies any fevers or chills  Denies any known sick contacts    Denies any nausea or vomiting  The following portions of the patient's history were reviewed and updated as appropriate:   She  has no past medical history on file  She   Patient Active Problem List    Diagnosis Date Noted    Tracheobronchitis 06/20/2022    Endometritis 11/27/2020    IUD strings lost 11/27/2020    Routine adult health maintenance 08/24/2020    Screening for cardiovascular condition 08/24/2020    Vitamin D deficiency 08/24/2020    PPD screening test 08/24/2020    Fatigue 10/08/2019    Frequent headaches 10/08/2019    Anxiety 10/29/2018    Panic attacks 10/29/2018    Menorrhagia with irregular cycle 07/05/2018    Allergic rhinitis 05/11/2017    Asthma 05/11/2017    Eczema 04/07/2017    Hypothyroidism 10/12/2016    Mild intermittent asthma without complication 35/02/6133     She  has no past surgical history on file  Her family history includes Cancer in her sister; Diabetes in her paternal grandfather  She  reports that she has been smoking cigarettes  She has never used smokeless tobacco  She reports that she does not drink alcohol and does not use drugs    Current Outpatient Medications   Medication Sig Dispense Refill    amoxicillin-clavulanate (AUGMENTIN) 875-125 mg per tablet Take 1 tablet by mouth 2 (two) times a day for 10 days 20 tablet 0    escitalopram (LEXAPRO) 10 mg tablet Take 1 tablet (10 mg total) by mouth daily 30 tablet 2    levothyroxine 50 mcg tablet Take 1 tablet (50 mcg total) by mouth daily 30 tablet 2    methylPREDNISolone 4 MG tablet therapy pack Use as directed on package 21 each 0    albuterol (ProAir HFA) 90 mcg/act inhaler Inhale 2 puffs every 6 (six) hours as needed for shortness of breath 2 Inhaler 3    ALPRAZolam (XANAX) 0 25 mg tablet Take 1 tablet (0 25 mg total) by mouth daily at bedtime as needed for anxiety 30 tablet 0    betamethasone dipropionate (DIPROSONE) 0 05 % cream Apply topically 2 (two) times a day 45 g 3    Blood Pressure Monitoring (BLOOD PRESSURE MONITOR/WRIST) KIT 1 Package by Other route 2 (two) times a day 1 kit 0    Cholecalciferol (VITAMIN D3) 12002 units TABS Take one tablet by mouth once weekly 4 tablet 3    levonorgestrel (MIRENA) 20 MCG/24HR IUD 1 each by Intrauterine route      naproxen (NAPROSYN) 500 mg tablet Take 1 tablet (500 mg total) by mouth 2 (two) times a day with meals 60 tablet 3    topiramate (TOPAMAX) 100 mg tablet take 1 tablet by mouth once daily 30 tablet 3    triamcinolone (KENALOG) 0 1 % cream Apply topically 2 (two) times a day 453 6 g 1     No current facility-administered medications for this visit  Current Outpatient Medications on File Prior to Visit   Medication Sig    albuterol (ProAir HFA) 90 mcg/act inhaler Inhale 2 puffs every 6 (six) hours as needed for shortness of breath    ALPRAZolam (XANAX) 0 25 mg tablet Take 1 tablet (0 25 mg total) by mouth daily at bedtime as needed for anxiety    betamethasone dipropionate (DIPROSONE) 0 05 % cream Apply topically 2 (two) times a day    Blood Pressure Monitoring (BLOOD PRESSURE MONITOR/WRIST) KIT 1 Package by Other route 2 (two) times a day    Cholecalciferol (VITAMIN D3) 31284 units TABS Take one tablet by mouth once weekly    levonorgestrel (MIRENA) 20 MCG/24HR IUD 1 each by Intrauterine route    naproxen (NAPROSYN) 500 mg tablet Take 1 tablet (500 mg total) by mouth 2 (two) times a day with meals    topiramate (TOPAMAX) 100 mg tablet take 1 tablet by mouth once daily    triamcinolone (KENALOG) 0 1 % cream Apply topically 2 (two) times a day     No current facility-administered medications on file prior to visit  She is allergic to cat hair extract, dog epithelium, nuts - food allergy, and pollen extract       Review of Systems   Constitutional: Positive for chills and fatigue  Negative for activity change, fever and unexpected weight change  HENT: Positive for congestion, ear pain, postnasal drip and sinus pressure      Respiratory: Positive for cough and wheezing  Negative for shortness of breath  Cardiovascular: Negative for chest pain and palpitations  Gastrointestinal: Negative for abdominal pain, diarrhea, nausea and vomiting  Musculoskeletal: Negative for arthralgias  Skin: Negative for rash  Allergic/Immunologic: Negative for immunocompromised state  Neurological: Negative for light-headedness  Objective:      Pulse 74   Temp 98 5 °F (36 9 °C)   SpO2 98%          Physical Exam  Vitals reviewed  Constitutional:       General: She is not in acute distress  Appearance: She is well-developed and well-groomed  HENT:      Head: Normocephalic and atraumatic  Comments: Boggy nasal mucosa with purulent nasal discharge right greater than left; posterior pharyngeal erythema; tympanic membranes with effusions right greater than left  Cardiovascular:      Rate and Rhythm: Normal rate and regular rhythm  Heart sounds: No murmur heard  Pulmonary:      Effort: No tachypnea or respiratory distress  Breath sounds: Transmitted upper airway sounds present  No decreased breath sounds or wheezing  Lymphadenopathy:      Cervical: No cervical adenopathy  Neurological:      Mental Status: She is alert  Psychiatric:         Behavior: Behavior is cooperative

## 2022-06-23 NOTE — PROGRESS NOTES
Assessment and Plan:   Juan Pablo Hinds is a 42 y o   female who presents for follow up of likely osteoarthritis, too early to see on x-rays  Gets knee pain when lying down  Inflammatory arthritis has been ruled out with further workup  Can continue naproxen twice a day as needed for joint pain  Can use diclofenac gel as needed for joint pain  PCP can continue to refill medications as needed    Return to clinic as needed    Plan:  Diagnoses and all orders for this visit:    Osteoarthritis, unspecified osteoarthritis type, unspecified site    Arthritis  -     Diclofenac Sodium (VOLTAREN) 1 %; Apply 2 g topically 4 (four) times a day  -     naproxen (NAPROSYN) 500 mg tablet; Take 1 tablet (500 mg total) by mouth 2 (two) times a day with meals  Follow-up plan: RTC as needed        Rheumatic Disease Summary  Initial visit 2/1/22: Juan Pablo Hinds is a 42 y o   female who presents as a Rheumatology consult referred by Corin Bashir  for evaluation of joint pain and elevated ESR, CRP  Patient had her baby 5 years ago  She has progressive pain in her knees for years that was getting worse for the past 6 month  She noticed hand swelling for the past few months  She also noticed numbness tingling in her hands  Her ESR was 28 and CRP 21 3  Patient had UTI at the same time, so can explain the elevated inflammatory markers  Will repeat inflammatory markers and do specific test for RA  Will do baseline imaging of the hands and knees  Patient takes ibuprofen 4 tablets up to 4-6 times a day with good effect, however the dose appears to be too high  I advised to use naproxen 500 mg bid instead  HPI  Juan Pablo Hinds is a 42 y o   female who presents for follow up  Last clinic visit was 2/1/22 which was her initial visit  Since then, her knee pain is manageable with naproxen, but still has it when lying down      The following portions of the patient's history were reviewed and updated as appropriate: allergies, current medications, past family history, past medical history, past social history, past surgical history and problem list     Review of Systems:   Review of Systems   Constitutional: Negative for fatigue  HENT: Negative for mouth sores  Eyes: Negative for pain  Respiratory: Negative for shortness of breath  Cardiovascular: Negative for leg swelling  Musculoskeletal: Positive for arthralgias  Negative for joint swelling  Skin: Negative for rash  Neurological: Negative for weakness  Hematological: Negative for adenopathy  Psychiatric/Behavioral: Negative for sleep disturbance  Reviewed and agree      Home Medications:    Current Outpatient Medications:     albuterol (ProAir HFA) 90 mcg/act inhaler, Inhale 2 puffs every 6 (six) hours as needed for shortness of breath, Disp: 2 Inhaler, Rfl: 3    ALPRAZolam (XANAX) 0 25 mg tablet, Take 1 tablet (0 25 mg total) by mouth daily at bedtime as needed for anxiety, Disp: 30 tablet, Rfl: 0    amoxicillin-clavulanate (AUGMENTIN) 875-125 mg per tablet, Take 1 tablet by mouth 2 (two) times a day for 10 days, Disp: 20 tablet, Rfl: 0    betamethasone dipropionate (DIPROSONE) 0 05 % cream, Apply topically 2 (two) times a day, Disp: 45 g, Rfl: 3    Diclofenac Sodium (VOLTAREN) 1 %, Apply 2 g topically 4 (four) times a day, Disp: 100 g, Rfl: 6    escitalopram (LEXAPRO) 10 mg tablet, Take 1 tablet (10 mg total) by mouth daily, Disp: 30 tablet, Rfl: 2    levonorgestrel (MIRENA) 20 MCG/24HR IUD, 1 each by Intrauterine route, Disp: , Rfl:     levothyroxine 50 mcg tablet, Take 1 tablet (50 mcg total) by mouth daily, Disp: 30 tablet, Rfl: 2    naproxen (NAPROSYN) 500 mg tablet, Take 1 tablet (500 mg total) by mouth 2 (two) times a day with meals, Disp: 60 tablet, Rfl: 11    topiramate (TOPAMAX) 100 mg tablet, take 1 tablet by mouth once daily, Disp: 30 tablet, Rfl: 3    triamcinolone (KENALOG) 0 1 % cream, Apply topically 2 (two) times a day, Disp: 453 6 g, Rfl: 1    Blood Pressure Monitoring (BLOOD PRESSURE MONITOR/WRIST) KIT, 1 Package by Other route 2 (two) times a day (Patient not taking: Reported on 6/24/2022), Disp: 1 kit, Rfl: 0    Cholecalciferol (VITAMIN D3) 56536 units TABS, Take one tablet by mouth once weekly (Patient not taking: Reported on 6/24/2022), Disp: 4 tablet, Rfl: 3    methylPREDNISolone 4 MG tablet therapy pack, Use as directed on package (Patient not taking: Reported on 6/24/2022), Disp: 21 each, Rfl: 0    Objective:    Vitals:    06/24/22 1207   BP: 118/62   Pulse: 74   Temp: (!) 97 4 °F (36 3 °C)   SpO2: 98%   Weight: 85 3 kg (188 lb)   Height: 5' 4" (1 626 m)       Physical Exam  Constitutional:       General: She is not in acute distress  HENT:      Head: Normocephalic and atraumatic  Eyes:      Conjunctiva/sclera: Conjunctivae normal    Cardiovascular:      Rate and Rhythm: Normal rate and regular rhythm  Heart sounds: S1 normal and S2 normal      No friction rub  Pulmonary:      Effort: Pulmonary effort is normal  No respiratory distress  Breath sounds: Normal breath sounds  No wheezing, rhonchi or rales  Musculoskeletal:      Cervical back: Neck supple  Skin:     Coloration: Skin is not pale  Neurological:      Mental Status: She is alert  Mental status is at baseline  Psychiatric:         Mood and Affect: Mood normal          Behavior: Behavior normal        Reviewed labs and imaging  Imaging:   XR bilateral knee 2/12/22  unremarkable    XR bilateral hand 2/12/22  unremarkable    XR cervical spine 1/31/20  Unremarkable cervical spine      Labs:   Office Visit on 02/01/2022   Component Date Value Ref Range Status    Sed Rate 02/12/2022 19  0 - 19 mm/hour Final    CRP 02/12/2022 0 8  <3 0 mg/L Final    Cyclic Citrullinated Peptide Ab  02/12/2022 2 4  See comment Final    Rheumatoid Factor 02/12/2022 Negative  Negative Final    Vit D, 25-Hydroxy 02/12/2022 18 5 (A) 30 0 - 100 0 ng/mL Final   Appointment on 01/04/2022   Component Date Value Ref Range Status    Sodium 01/04/2022 141  136 - 145 mmol/L Final    Potassium 01/04/2022 3 9  3 5 - 5 3 mmol/L Final    Chloride 01/04/2022 104  100 - 108 mmol/L Final    CO2 01/04/2022 24  21 - 32 mmol/L Final    ANION GAP 01/04/2022 13  4 - 13 mmol/L Final    BUN 01/04/2022 17  5 - 25 mg/dL Final    Creatinine 01/04/2022 0 92  0 60 - 1 30 mg/dL Final    Standardized to IDMS reference method    Glucose, Fasting 01/04/2022 109 (A) 65 - 99 mg/dL Final    Specimen collection should occur prior to Sulfasalazine administration due to the potential for falsely depressed results  Specimen collection should occur prior to Sulfapyridine administration due to the potential for falsely elevated results   Calcium 01/04/2022 8 9  8 3 - 10 1 mg/dL Final    AST 01/04/2022 31  5 - 45 U/L Final    Specimen collection should occur prior to Sulfasalazine administration due to the potential for falsely depressed results   ALT 01/04/2022 65  12 - 78 U/L Final    Specimen collection should occur prior to Sulfasalazine administration due to the potential for falsely depressed results   Alkaline Phosphatase 01/04/2022 98  46 - 116 U/L Final    Total Protein 01/04/2022 8 0  6 4 - 8 2 g/dL Final    Albumin 01/04/2022 4 0  3 5 - 5 0 g/dL Final    Total Bilirubin 01/04/2022 0 64  0 20 - 1 00 mg/dL Final    Use of this assay is not recommended for patients undergoing treatment with eltrombopag due to the potential for falsely elevated results      eGFR 01/04/2022 79  ml/min/1 73sq m Final    WBC 01/04/2022 9 30  4 31 - 10 16 Thousand/uL Final    RBC 01/04/2022 4 67  3 81 - 5 12 Million/uL Final    Hemoglobin 01/04/2022 14 9  11 5 - 15 4 g/dL Final    Hematocrit 01/04/2022 47 1 (A) 34 8 - 46 1 % Final    MCV 01/04/2022 101 (A) 82 - 98 fL Final    MCH 01/04/2022 31 9  26 8 - 34 3 pg Final    MCHC 01/04/2022 31 6  31 4 - 37 4 g/dL Final    RDW 01/04/2022 12 5  11 6 - 15 1 % Final  MPV 01/04/2022 9 4  8 9 - 12 7 fL Final    Platelets 92/36/4111 308  149 - 390 Thousands/uL Final    nRBC 01/04/2022 0  /100 WBCs Final    Neutrophils Relative 01/04/2022 62  43 - 75 % Final    Immat GRANS % 01/04/2022 0  0 - 2 % Final    Lymphocytes Relative 01/04/2022 24  14 - 44 % Final    Monocytes Relative 01/04/2022 9  4 - 12 % Final    Eosinophils Relative 01/04/2022 4  0 - 6 % Final    Basophils Relative 01/04/2022 1  0 - 1 % Final    Neutrophils Absolute 01/04/2022 5 80  1 85 - 7 62 Thousands/µL Final    Immature Grans Absolute 01/04/2022 0 02  0 00 - 0 20 Thousand/uL Final    Lymphocytes Absolute 01/04/2022 2 27  0 60 - 4 47 Thousands/µL Final    Monocytes Absolute 01/04/2022 0 81  0 17 - 1 22 Thousand/µL Final    Eosinophils Absolute 01/04/2022 0 35  0 00 - 0 61 Thousand/µL Final    Basophils Absolute 01/04/2022 0 05  0 00 - 0 10 Thousands/µL Final    TSH 3RD GENERATON 01/04/2022 4 040 (A) 0 358 - 3 740 uIU/mL Final    The recommended reference ranges for TSH during pregnancy are as follows:   First trimester 0 1 to 2 5 uIU/mL   Second trimester  0 2 to 3 0 uIU/mL   Third trimester 0 3 to 3 0 uIU/m    Note: Normal ranges may not apply to patients who are transgender, non-binary, or whose legal sex, sex at birth, and gender identity differ   CRP 01/04/2022 21 3 (A) <3 0 mg/L Final    Sed Rate 01/04/2022 28 (A) 0 - 19 mm/hour Final    Free T4 01/04/2022 1 08  0 76 - 1 46 ng/dL Final    Specimen collection should occur prior to Sulfasalazine administration due to the potential for falsely elevated results      Color, UA 01/04/2022 Light Yellow   Final    Clarity, UA 01/04/2022 Slightly Cloudy   Final    Specific Gravity, UA 01/04/2022 <=1 005  1 003 - 1 030 Final    pH, UA 01/04/2022 6 0  4 5, 5 0, 5 5, 6 0, 6 5, 7 0, 7 5, 8 0 Final    Leukocytes, UA 01/04/2022 Moderate (A) Negative Final    Nitrite, UA 01/04/2022 Negative  Negative Final    Protein, UA 01/04/2022 Negative Negative mg/dl Final    Glucose, UA 01/04/2022 Negative  Negative mg/dl Final    Ketones, UA 01/04/2022 Negative  Negative mg/dl Final    Urobilinogen, UA 01/04/2022 0 2  0 2, 1 0 E U /dl E U /dl Final    Bilirubin, UA 01/04/2022 Negative  Negative Final    Occult Blood, UA 01/04/2022 Small (A) Negative Final    RBC, UA 01/04/2022 1-2  None Seen, 0-1, 1-2, 2-4, 0-5 /hpf Final    WBC, UA 01/04/2022 10-20 (A) None Seen, 0-1, 1-2, 0-5, 2-4 /hpf Final    Epithelial Cells 01/04/2022 Occasional  None Seen, Occasional /hpf Final    Bacteria, UA 01/04/2022 Occasional  None Seen, Occasional /hpf Final    Urine Culture 01/04/2022 40,000-49,000 cfu/ml Escherichia coli (A)  Final

## 2022-06-24 ENCOUNTER — OFFICE VISIT (OUTPATIENT)
Dept: RHEUMATOLOGY | Facility: CLINIC | Age: 37
End: 2022-06-24
Payer: COMMERCIAL

## 2022-06-24 VITALS
TEMPERATURE: 97.4 F | OXYGEN SATURATION: 98 % | DIASTOLIC BLOOD PRESSURE: 62 MMHG | HEIGHT: 64 IN | HEART RATE: 74 BPM | BODY MASS INDEX: 32.1 KG/M2 | WEIGHT: 188 LBS | SYSTOLIC BLOOD PRESSURE: 118 MMHG

## 2022-06-24 DIAGNOSIS — M19.90 OSTEOARTHRITIS, UNSPECIFIED OSTEOARTHRITIS TYPE, UNSPECIFIED SITE: Primary | ICD-10-CM

## 2022-06-24 DIAGNOSIS — M19.90 ARTHRITIS: ICD-10-CM

## 2022-06-24 PROCEDURE — 3008F BODY MASS INDEX DOCD: CPT | Performed by: INTERNAL MEDICINE

## 2022-06-24 PROCEDURE — 99214 OFFICE O/P EST MOD 30 MIN: CPT | Performed by: INTERNAL MEDICINE

## 2022-06-24 RX ORDER — NAPROXEN 500 MG/1
500 TABLET ORAL 2 TIMES DAILY WITH MEALS
Qty: 60 TABLET | Refills: 11 | Status: SHIPPED | OUTPATIENT
Start: 2022-06-24

## 2022-06-24 NOTE — PATIENT INSTRUCTIONS
Can continue naproxen twice a day as needed for joint pain  Can use diclofenac gel as needed for joint pain  PCP can continue to refill medications as needed    Return to clinic as needed

## 2022-08-05 ENCOUNTER — OFFICE VISIT (OUTPATIENT)
Dept: INTERNAL MEDICINE CLINIC | Facility: CLINIC | Age: 37
End: 2022-08-05
Payer: COMMERCIAL

## 2022-08-05 DIAGNOSIS — U07.1 COVID-19: Primary | ICD-10-CM

## 2022-08-05 LAB
SARS-COV-2 AG UPPER RESP QL IA: POSITIVE
VALID CONTROL: ABNORMAL

## 2022-08-05 PROCEDURE — 99214 OFFICE O/P EST MOD 30 MIN: CPT | Performed by: NURSE PRACTITIONER

## 2022-08-05 PROCEDURE — 87811 SARS-COV-2 COVID19 W/OPTIC: CPT | Performed by: NURSE PRACTITIONER

## 2022-08-05 NOTE — PROGRESS NOTES
COVID-19 Outpatient Progress Note    Assessment/Plan: Rapid covid is positive  Patient is not vaccinated  Will start on Paxlovid  Did advise to stay well hydrated and take Tylenol or Motrin for pain or fever  Will follow up on Monday  Problem List Items Addressed This Visit    None     Visit Diagnoses     COVID-19    -  Primary    Relevant Medications    nirmatrelvir & ritonavir (Paxlovid) tablet therapy pack    Other Relevant Orders    POCT Rapid Covid Ag (Completed)         Disposition:     I have spent 15 minutes directly with the patient  [facility]    Provider located at [address]  [address] [facility]  [id number]    Recent Visits  No visits were found meeting these conditions  Showing recent visits within past 7 days and meeting all other requirements  Today's Visits  Date Type Provider Dept   08/05/22 Office Visit [facility]   Showing today's visits and meeting all other requirements  Future Appointments  No visits were found meeting these conditions  Showing future appointments within next 150 days and meeting all other requirements     Subjective:   Armaan Munguia is a 42 y o  female who is concerned about COVID-19  Patient's symptoms include fatigue, nasal congestion, rhinorrhea and myalgias       - Date of symptom onset: 8/2/2022      COVID-19 vaccination status: Not vaccinated    Exposure:   Contact with a person who is under investigation (PUI) for or who is positive for COVID-19 within the last 14 days?: No    Hospitalized recently for fever and/or lower respiratory symptoms?: No      Currently a healthcare worker that is involved in direct patient care?: No      Works in a special setting where the risk of COVID-19 transmission may be high? (this may include long-term care, correctional and [facility] facilities; homeless shelters; assisted-living facilities and group homes ): No      Resident in a special setting where the risk of COVID-19 transmission may be high? (this may include long-term care, correctional and long term facilities; homeless shelters; assisted-living facilities and group homes ): No      Lab Results   Component Value Date    SARSCOV2 Negative 2021    [address] [facility] [address] Not Detected 2020    SARSCOVAG Positive (A) 2022     Past Medical History:   Diagnosis Date    Thyroid disease      Past Surgical History:   Procedure Laterality Date     SECTION       Current Outpatient Medications   Medication Sig Dispense Refill    nirmatrelvir & ritonavir (Paxlovid) tablet therapy pack Take 3 tablets by mouth 2 (two) times a day for 5 days Take 2 nirmatrelvir tablets + 1 ritonavir tablet together per dose 30 tablet 0    albuterol (ProAir HFA) 90 mcg/act inhaler Inhale 2 puffs every 6 (six) hours as needed for shortness of breath 2 Inhaler 3    ALPRAZolam (XANAX) 0 25 mg tablet Take 1 tablet (0 25 mg total) by mouth daily at bedtime as needed for anxiety 30 tablet 0    betamethasone dipropionate (DIPROSONE) 0 05 % cream Apply topically 2 (two) times a day 45 g 3    Blood Pressure Monitoring (BLOOD PRESSURE MONITOR/WRIST) KIT 1 Package by Other route 2 (two) times a day (Patient not taking: Reported on 2022) 1 kit 0    Cholecalciferol (VITAMIN D3) 63316 units TABS Take one tablet by mouth once weekly (Patient not taking: Reported on 2022) 4 tablet 3    Diclofenac Sodium (VOLTAREN) 1 % Apply 2 g topically 4 (four) times a day 100 g 6    escitalopram (LEXAPRO) 10 mg tablet Take 1 tablet (10 mg total) by mouth daily 30 tablet 2    levonorgestrel (MIRENA) 20 MCG/24HR IUD 1 each by Intrauterine route      levothyroxine 50 mcg tablet Take 1 tablet (50 mcg total) by mouth daily 30 tablet 2    methylPREDNISolone 4 MG tablet therapy pack Use as directed on package (Patient not taking: Reported on 2022) 21 each 0    naproxen (NAPROSYN) 500 mg tablet Take 1 tablet (500 mg total) by mouth 2 (two) times a day with meals 60 tablet 11    topiramate (TOPAMAX) 100 mg tablet take 1 tablet by mouth once daily 30 tablet 3    triamcinolone (KENALOG) 0 1 % cream Apply topically 2 (two) times a day 453 6 g 1     No current facility-administered medications for this visit  Allergies   Allergen Reactions    Cat Hair Extract     Dog Epithelium     Nuts - Food Allergy     Pollen Extract        Review of Systems   Constitutional: Positive for fatigue  HENT: Positive for congestion and rhinorrhea  Musculoskeletal: Positive for myalgias  Objective: There were no vitals filed for this visit  Physical Exam  Constitutional:       Appearance: Normal appearance  Cardiovascular:      Rate and Rhythm: Normal rate and regular rhythm  Pulses: Normal pulses  Heart sounds: Normal heart sounds  Pulmonary:      Effort: Pulmonary effort is normal       Breath sounds: Normal breath sounds  Skin:     General: Skin is warm and dry  Capillary Refill: Capillary refill takes less than 2 seconds  Neurological:      General: No focal deficit present  Mental Status: She is alert and oriented to person, place, and time  Mental status is at baseline  Psychiatric:         Mood and Affect: Mood normal          Behavior: Behavior normal          Thought Content: Thought content normal          Judgment: Judgment normal          VIRTUAL VISIT DISCLAIMER    Leigh Payne verbally agrees to participate in [employer]  Pt is aware that [employer] could be limited without vital signs or the ability to perform a full hands-on physical  Roles understands she or the provider may request at any time to terminate the video visit and request the patient to seek care or treatment in person

## 2022-08-11 ENCOUNTER — TELEPHONE (OUTPATIENT)
Dept: INTERNAL MEDICINE CLINIC | Facility: CLINIC | Age: 37
End: 2022-08-11

## 2022-10-07 DIAGNOSIS — J45.20 MILD INTERMITTENT ASTHMA WITHOUT COMPLICATION: ICD-10-CM

## 2022-10-07 RX ORDER — ALBUTEROL SULFATE 90 UG/1
2 AEROSOL, METERED RESPIRATORY (INHALATION) EVERY 6 HOURS PRN
Qty: 90 G | Refills: 3 | Status: SHIPPED | OUTPATIENT
Start: 2022-10-07

## 2022-11-30 DIAGNOSIS — E03.9 HYPOTHYROIDISM, UNSPECIFIED TYPE: ICD-10-CM

## 2022-11-30 DIAGNOSIS — F41.9 ANXIETY: ICD-10-CM

## 2022-11-30 RX ORDER — ESCITALOPRAM OXALATE 10 MG/1
TABLET ORAL
Qty: 30 TABLET | Refills: 2 | Status: SHIPPED | OUTPATIENT
Start: 2022-11-30

## 2022-11-30 RX ORDER — LEVOTHYROXINE SODIUM 0.05 MG/1
TABLET ORAL
Qty: 30 TABLET | Refills: 2 | Status: SHIPPED | OUTPATIENT
Start: 2022-11-30

## 2022-12-05 ENCOUNTER — APPOINTMENT (OUTPATIENT)
Dept: LAB | Facility: HOSPITAL | Age: 37
End: 2022-12-05

## 2022-12-05 ENCOUNTER — TELEPHONE (OUTPATIENT)
Dept: INTERNAL MEDICINE CLINIC | Facility: CLINIC | Age: 37
End: 2022-12-05

## 2022-12-05 DIAGNOSIS — R30.9 PAIN WITH URINATION: ICD-10-CM

## 2022-12-05 DIAGNOSIS — R30.9 PAIN WITH URINATION: Primary | ICD-10-CM

## 2022-12-05 DIAGNOSIS — R10.30 LOWER ABDOMINAL PAIN: ICD-10-CM

## 2022-12-05 LAB
BACTERIA UR QL AUTO: NORMAL /HPF
BILIRUB UR QL STRIP: NEGATIVE
CLARITY UR: CLEAR
COLOR UR: YELLOW
GLUCOSE UR STRIP-MCNC: NEGATIVE MG/DL
HGB UR QL STRIP.AUTO: ABNORMAL
KETONES UR STRIP-MCNC: NEGATIVE MG/DL
LEUKOCYTE ESTERASE UR QL STRIP: NEGATIVE
NITRITE UR QL STRIP: NEGATIVE
NON-SQ EPI CELLS URNS QL MICRO: NORMAL /HPF
PH UR STRIP.AUTO: 7 [PH]
PROT UR STRIP-MCNC: NEGATIVE MG/DL
RBC #/AREA URNS AUTO: NORMAL /HPF
SP GR UR STRIP.AUTO: 1.01 (ref 1–1.03)
UROBILINOGEN UR QL STRIP.AUTO: 0.2 E.U./DL
WBC #/AREA URNS AUTO: NORMAL /HPF

## 2022-12-05 NOTE — TELEPHONE ENCOUNTER
Pt left message stating she thinks she has a UTI  I returned call and as per our conversation I put the orders in  Pleae send Abx to [facility]    She does not have a preference

## 2022-12-06 LAB — BACTERIA UR CULT: NORMAL

## 2022-12-08 DIAGNOSIS — R51.9 FREQUENT HEADACHES: ICD-10-CM

## 2022-12-09 RX ORDER — TOPIRAMATE 100 MG/1
TABLET, FILM COATED ORAL
Qty: 30 TABLET | Refills: 3 | Status: SHIPPED | OUTPATIENT
Start: 2022-12-09

## 2023-01-17 DIAGNOSIS — E03.9 HYPOTHYROIDISM, UNSPECIFIED TYPE: ICD-10-CM

## 2023-01-17 DIAGNOSIS — R51.9 FREQUENT HEADACHES: ICD-10-CM

## 2023-01-17 DIAGNOSIS — F41.9 ANXIETY: ICD-10-CM

## 2023-01-17 RX ORDER — TOPIRAMATE 100 MG/1
100 TABLET, FILM COATED ORAL DAILY
Qty: 90 TABLET | Refills: 3 | Status: SHIPPED | OUTPATIENT
Start: 2023-01-17

## 2023-01-17 RX ORDER — LEVOTHYROXINE SODIUM 0.05 MG/1
50 TABLET ORAL DAILY
Qty: 90 TABLET | Refills: 3 | Status: SHIPPED | OUTPATIENT
Start: 2023-01-17

## 2023-01-17 RX ORDER — ESCITALOPRAM OXALATE 10 MG/1
10 TABLET ORAL DAILY
Qty: 90 TABLET | Refills: 3 | Status: SHIPPED | OUTPATIENT
Start: 2023-01-17

## 2023-01-20 ENCOUNTER — OFFICE VISIT (OUTPATIENT)
Dept: INTERNAL MEDICINE CLINIC | Facility: CLINIC | Age: 38
End: 2023-01-20

## 2023-01-20 VITALS
BODY MASS INDEX: 30.73 KG/M2 | OXYGEN SATURATION: 97 % | DIASTOLIC BLOOD PRESSURE: 72 MMHG | HEART RATE: 81 BPM | TEMPERATURE: 97.7 F | WEIGHT: 180 LBS | HEIGHT: 64 IN | SYSTOLIC BLOOD PRESSURE: 122 MMHG

## 2023-01-20 DIAGNOSIS — F32.A ANXIETY AND DEPRESSION: Primary | ICD-10-CM

## 2023-01-20 DIAGNOSIS — F41.9 ANXIETY AND DEPRESSION: Primary | ICD-10-CM

## 2023-01-20 DIAGNOSIS — F41.0 PANIC ATTACKS: ICD-10-CM

## 2023-01-20 PROBLEM — J40 TRACHEOBRONCHITIS: Status: RESOLVED | Noted: 2022-06-20 | Resolved: 2023-01-20

## 2023-01-20 NOTE — PATIENT INSTRUCTIONS
Low Fat Diet   AMBULATORY CARE:   A low-fat diet  is an eating plan that is low in total fat, unhealthy fat, and cholesterol  You may need to follow a low-fat diet if you have trouble digesting or absorbing fat  You may also need to follow this diet if you have high cholesterol  You can also lower your cholesterol by increasing the amount of fiber in your diet  Soluble fiber is a type of fiber that helps to decrease cholesterol levels  Different types of fat in food:   · Limit unhealthy fats  A diet that is high in cholesterol, saturated fat, and trans fat may cause unhealthy cholesterol levels  Unhealthy cholesterol levels increase your risk of heart disease  ? Cholesterol:  Limit intake of cholesterol to less than 200 mg per day  Cholesterol is found in meat, eggs, and dairy  ? Saturated fat:  Limit saturated fat to less than 7% of your total daily calories  Ask your dietitian how many calories you need each day  Saturated fat is found in butter, cheese, ice cream, whole milk, and palm oil  Saturated fat is also found in meat, such as beef, pork, chicken skin, and processed meats  Processed meats include sausage, hot dogs, and bologna  ? Trans fat:  Avoid trans fat as much as possible  Trans fat is used in fried and baked foods  Foods that say trans fat free on the label may still have up to 0 5 grams of trans fat per serving  · Include healthy fats  Replace foods that are high in saturated and trans fat with foods high in healthy fats  This may help to decrease high cholesterol levels  ? Monounsaturated fats: These are found in avocados, nuts, and vegetable oils, such as olive, canola, and sunflower oil  ? Polyunsaturated fats: These can be found in vegetable oils, such as soybean or corn oil  Omega-3 fats can help to decrease the risk of heart disease  Omega-3 fats are found in fish, such as salmon, herring, trout, and tuna   Omega-3 fats can also be found in plant foods, such as walnuts, flaxseed, soybeans, and canola oil  Foods to limit or avoid:   · Grains:      ? Snacks that are made with partially hydrogenated oils, such as chips, regular crackers, and butter-flavored popcorn    ? High-fat baked goods, such as biscuits, croissants, doughnuts, pies, cookies, and pastries    · Dairy:      ? Whole milk, 2% milk, and yogurt and ice cream made with whole milk    ? Half and half creamer, heavy cream, and whipping cream    ? Cheese, cream cheese, and sour cream    · Meats and proteins:      ? High-fat cuts of meat (T-bone steak, regular hamburger, and ribs)    ? Fried meat, poultry (turkey and chicken), and fish    ? Poultry (chicken and turkey) with skin    ? Cold cuts (salami or bologna), hot dogs, fulton, and sausage    ? Whole eggs and egg yolks    · Vegetables and fruits with added fat:      ? Fried vegetables or vegetables in butter or high-fat sauces, such as cream or cheese sauces    ? Fried fruit or fruit served with butter or cream    · Fats:      ? Butter, stick margarine, and shortening    ? Coconut, palm oil, and palm kernel oil    Foods to include:   · Grains:      ? Whole-grain breads, cereals, pasta, and brown rice    ? Low-fat crackers and pretzels    · Vegetables and fruits:      ? Fresh, frozen, or canned vegetables (no salt or low-sodium)    ? Fresh, frozen, dried, or canned fruit (canned in light syrup or fruit juice)    ? Avocado    · Low-fat dairy products:      ? Nonfat (skim) or 1% milk    ? Nonfat or low-fat cheese, yogurt, and cottage cheese    · Meats and proteins:      ? Chicken or turkey with no skin    ? Baked or broiled fish    ? Lean beef and pork (loin, round, extra lean hamburger)    ? Beans and peas, unsalted nuts, soy products    ? Egg whites and substitutes    ? Seeds and nuts    · Fats:      ? Unsaturated oil, such as canola, olive, peanut, soybean, or sunflower oil    ? Soft or liquid margarine and vegetable oil spread    ?  Low-fat salad dressing    Other ways to decrease fat:   · Read food labels before you buy foods  Choose foods that have less than 30% of calories from fat  Choose low-fat or fat-free dairy products  Remember that fat free does not mean calorie free  These foods still contain calories, and too many calories can lead to weight gain  · Trim fat from meat and avoid fried food  Trim all visible fat from meat before you cook it  Remove the skin from poultry  Do not hoyt meat, fish, or poultry  Bake, roast, boil, or broil these foods instead  Avoid fried foods  Eat a baked potato instead of [place] fries  Steam vegetables instead of sautéing them in butter  · Add less fat to foods  Use imitation fulton bits on salads and baked potatoes instead of regular fulton bits  Use fat-free or low-fat salad dressings instead of regular dressings  Use low-fat or nonfat butter-flavored topping instead of regular butter or margarine on popcorn and other foods  Ways to decrease fat in recipes:  Replace high-fat ingredients with low-fat or nonfat ones  This may cause baked goods to be drier than usual  You may need to use nonfat cooking spray on pans to prevent food from sticking  You also may need to change the amount of other ingredients, such as water, in the recipe  Try the following:  · Use low-fat or light margarine instead of regular margarine or shortening  · Use lean ground turkey breast or chicken, or lean ground beef (less than 5% fat) instead of hamburger  · Add 1 teaspoon of canola oil to 8 ounces of skim milk instead of using cream or half and half  · Use grated zucchini, carrots, or apples in breads instead of coconut  · Use blenderized, low-fat cottage cheese, plain tofu, or low-fat ricotta cheese instead of cream cheese  · Use 1 egg white and 1 teaspoon of canola oil, or use ¼ cup (2 ounces) of fat-free egg substitute instead of a whole egg       · Replace half of the oil that is called for in a recipe with applesauce when you bake  Use 3 tablespoons of cocoa powder and 1 tablespoon of canola oil instead of a square of baking chocolate  How to increase fiber:  Eat enough high-fiber foods to get 20 to 30 grams of fiber every day  Slowly increase your fiber intake to avoid stomach cramps, gas, and other problems  · Eat 3 ounces of whole-grain foods each day  An ounce is about 1 slice of bread  Eat whole-grain breads, such as whole-wheat bread  Whole wheat, whole-wheat flour, or other whole grains should be listed as the first ingredient on the food label  Replace white flour with whole-grain flour or use half of each in recipes  Whole-grain flour is heavier than white flour, so you may have to add more yeast or baking powder  · Eat a high-fiber cereal for breakfast   Oatmeal is a good source of soluble fiber  Look for cereals that have bran or fiber in the name  Choose whole-grain products, such as brown rice, barley, and whole-wheat pasta  · Eat more beans, peas, and lentils  For example, add beans to soups or salads  Eat at least 5 cups of fruits and vegetables each day  Eat fruits and vegetables with the peel because the peel is high in fiber  © Copyright [employer] 2022 Information is for End User's use only and may not be sold, redistributed or otherwise used for commercial purposes  All illustrations and images included in CareNotes® are the copyrighted property of A D A M , Inc  or [address] [place], [place] [id number]   The above information is an  only  It is not intended as medical advice for individual conditions or treatments  Talk to your doctor, nurse or pharmacist before following any medical regimen to see if it is safe and effective for you  Heart Healthy Diet   AMBULATORY CARE:   A heart healthy diet  is an eating plan low in unhealthy fats and sodium (salt)  The plan is high in healthy fats and fiber   A heart healthy diet helps improve your cholesterol levels and lowers your risk for heart disease and stroke  A dietitian will teach you how to read and understand food labels  Heart healthy diet guidelines to follow:   · Choose foods that contain healthy fats  ? Unsaturated fats  include monounsaturated and polyunsaturated fats  Unsaturated fat is found in foods such as soybean, canola, olive, corn, and safflower oils  It is also found in soft tub margarine that is made with liquid vegetable oil  ? Omega-3 fat  is found in certain fish, such as salmon, tuna, and trout, and in walnuts and flaxseed  Eat fish high in omega-3 fats at least 2 times a week  · Get 20 to 30 grams of fiber each day  Fruits, vegetables, whole-grain foods, and legumes (cooked beans) are good sources of fiber  · Limit or do not have unhealthy fats  ? Cholesterol  is found in animal foods, such as eggs and lobster, and in dairy products made from whole milk  Limit cholesterol to less than 200 mg each day  ? Saturated fat  is found in meats, such as fulton and hamburger  It is also found in chicken or turkey skin, whole milk, and butter  Limit saturated fat to less than 7% of your total daily calories  ? Trans fat  is found in packaged foods, such as potato chips and cookies  It is also in hard margarine, some fried foods, and shortening  Do not eat foods that contain trans fats  · Limit sodium as directed  You may be told to limit sodium to 2,000 to 2,300 mg each day  Choose low-sodium or no-salt-added foods  Add little or no salt to food you prepare  Use herbs and spices in place of salt  Include the following in your heart healthy plan:  Ask your dietitian or healthcare provider how many servings to have from each of the following food groups:  · Grains:      ? Whole-wheat breads, cereals, and pastas, and brown rice    ? Low-fat, low-sodium crackers and chips    · Vegetables:      ? Broccoli, green beans, green peas, and spinach    ? Collards, kale, and lima beans    ?  Carrots, sweet potatoes, tomatoes, and peppers    ? Canned vegetables with no salt added    · Fruits:      ? Bananas, peaches, pears, and pineapple    ? Grapes, raisins, and dates    ? Oranges, tangerines, grapefruit, orange juice, and grapefruit juice    ? Apricots, mangoes, melons, and papaya    ? Raspberries and strawberries    ? Canned fruit with no added sugar    · Low-fat dairy:      ? Nonfat (skim) milk, 1% milk, and low-fat almond, cashew, or soy milks fortified with calcium    ? Low-fat cheese, regular or frozen yogurt, and cottage cheese    · Meats and proteins:      ? Lean cuts of beef and pork (loin, leg, round), skinless chicken and turkey    ? Legumes, soy products, egg whites, or nuts    Limit or do not include the following in your heart healthy plan:   · Unhealthy fats and oils:      ? Whole or 2% milk, cream cheese, sour cream, or cheese    ? High-fat cuts of beef (T-bone steaks, ribs), chicken or turkey with skin, and organ meats such as liver    ? Butter, stick margarine, shortening, and cooking oils such as coconut or palm oil    · Foods and liquids high in sodium:      ? Packaged foods, such as frozen dinners, cookies, macaroni and cheese, and cereals with more than 300 mg of sodium per serving    ? Vegetables with added sodium, such as instant potatoes, vegetables with added sauces, or regular canned vegetables    ? Cured or smoked meats, such as hot dogs, fulton, and sausage    ? High-sodium ketchup, barbecue sauce, salad dressing, pickles, olives, soy sauce, or miso    · Foods and liquids high in sugar:      ? Candy, cake, cookies, pies, or doughnuts    ? Soft drinks (soda), sports drinks, or sweetened tea    ? Canned or dry mixes for cakes, soups, sauces, or gravies    Other healthy heart guidelines:   · Do not smoke  Nicotine and other chemicals in cigarettes and cigars can cause lung and heart damage  Ask your healthcare provider for information if you currently smoke and need help to quit  E-cigarettes or smokeless tobacco still contain nicotine  Talk to your healthcare provider before you use these products  · Limit or do not drink alcohol as directed  Alcohol can damage your heart and raise your blood pressure  Your healthcare provider may give you specific daily and weekly limits  The general recommended limit is 1 drink a day for women 21 or older and for men 72 or older  Do not have more than 3 drinks in a day or 7 in a week  The recommended limit is 2 drinks a day for men 24to 59years of age  Do not have more than 4 drinks in a day or 14 in a week  A drink of alcohol is 12 ounces of beer, 5 ounces of wine, or 1½ ounces of liquor  · Exercise regularly  Exercise can help you maintain a healthy weight and improve your blood pressure and cholesterol levels  Regular exercise can also decrease your risk for heart problems  Ask your healthcare provider about the best exercise plan for you  Do not start an exercise program without asking your healthcare provider  Follow up with your doctor or cardiologist as directed:  Write down your questions so you remember to ask them during your visits  © Copyright [employer] 2022 Information is for End User's use only and may not be sold, redistributed or otherwise used for commercial purposes  All illustrations and images included in CareNotes® are the copyrighted property of A D A M , Inc  or [facility] Yunier Lomas   The above information is an  only  It is not intended as medical advice for individual conditions or treatments  Talk to your doctor, nurse or pharmacist before following any medical regimen to see if it is safe and effective for you

## 2023-01-20 NOTE — PROGRESS NOTES
Name: Bart Chiu      : 1985      MRN: [id number]  Encounter Provider: CIRILO Agosto  Encounter Date: 2023   Encounter department: [address] [place] [address], [address]  Will increase her Lexapro to 20 mg daily  Did advise if symptoms worsen to amy the office  Did advise having her labs done  Will follow up in one month or sooner if need be  1  Anxiety and depression    2  BMI 30 0-30 9,adult    3  Panic attacks           Subjective      Cermonique is for an acute visit  She is having a lot of anxiety and depression with stressors at home  She is taking Lexapro but feels this is not helping  She does use Xanax as needed  She is inquiring to either switch her medication or increase it  She offers no other issues  Review of Systems   Psychiatric/Behavioral: The patient is nervous/anxious  All other systems reviewed and are negative        Current Outpatient Medications on File Prior to Visit   Medication Sig   • albuterol (ProAir HFA) 90 mcg/act inhaler Inhale 2 puffs every 6 (six) hours as needed for wheezing or shortness of breath   • ALPRAZolam (XANAX) 0 25 mg tablet Take 1 tablet (0 25 mg total) by mouth daily at bedtime as needed for anxiety   • betamethasone dipropionate (DIPROSONE) 0 05 % cream Apply topically 2 (two) times a day   • escitalopram (LEXAPRO) 10 mg tablet Take 1 tablet (10 mg total) by mouth daily   • levonorgestrel (MIRENA) 20 MCG/24HR IUD 1 each by Intrauterine route   • levothyroxine 50 mcg tablet Take 1 tablet (50 mcg total) by mouth daily   • topiramate (TOPAMAX) 100 mg tablet Take 1 tablet (100 mg total) by mouth daily   • triamcinolone (KENALOG) 0 1 % cream Apply topically 2 (two) times a day   • [DISCONTINUED] Blood Pressure Monitoring (BLOOD PRESSURE MONITOR/WRIST) KIT 1 Package by Other route 2 (two) times a day   • [DISCONTINUED] Cholecalciferol (VITAMIN D3) 28537 units TABS Take one tablet by mouth once weekly (Patient not taking: Reported on 6/24/2022)   • [DISCONTINUED] Diclofenac Sodium (VOLTAREN) 1 % Apply 2 g topically 4 (four) times a day (Patient not taking: Reported on 1/20/2023)   • [DISCONTINUED] methylPREDNISolone 4 MG tablet therapy pack Use as directed on package (Patient not taking: Reported on 6/24/2022)   • [DISCONTINUED] naproxen (NAPROSYN) 500 mg tablet Take 1 tablet (500 mg total) by mouth 2 (two) times a day with meals (Patient not taking: Reported on 1/20/2023)       Objective     /72   Pulse 81   Temp 97 7 °F (36 5 °C) (Temporal)   Ht 5' 4" (1 626 m)   Wt 81 6 kg (180 lb)   SpO2 97%   BMI 30 90 kg/m²     Physical Exam  Vitals reviewed  Constitutional:       Appearance: Normal appearance  She is normal weight  Cardiovascular:      Rate and Rhythm: Normal rate and regular rhythm  Pulses: Normal pulses  Heart sounds: Normal heart sounds  Pulmonary:      Effort: Pulmonary effort is normal       Breath sounds: Normal breath sounds  Musculoskeletal:         General: Normal range of motion  Cervical back: Normal range of motion and neck supple  Skin:     General: Skin is warm and dry  Capillary Refill: Capillary refill takes less than 2 seconds  Neurological:      General: No focal deficit present  Mental Status: She is alert and oriented to person, place, and time  Mental status is at baseline  Psychiatric:         Mood and Affect: Mood normal          Behavior: Behavior normal          Thought Content: Thought content normal          Judgment: Judgment normal        CIRILO Condon  BMI Counseling: Body mass index is 30 9 kg/m²   The BMI is above normal  Nutrition recommendations include reducing portion sizes, decreasing overall calorie intake, 3-5 servings of fruits/vegetables daily, reducing fast food intake, consuming healthier snacks, decreasing soda and/or juice intake, moderation in carbohydrate intake, increasing intake of lean protein, reducing intake of saturated fat and trans fat and reducing intake of cholesterol

## 2023-04-06 ENCOUNTER — TELEPHONE (OUTPATIENT)
Dept: INTERNAL MEDICINE CLINIC | Facility: CLINIC | Age: 38
End: 2023-04-06

## 2023-04-06 DIAGNOSIS — J01.10 ACUTE NON-RECURRENT FRONTAL SINUSITIS: Primary | ICD-10-CM

## 2023-04-06 RX ORDER — AMOXICILLIN AND CLAVULANATE POTASSIUM 875; 125 MG/1; MG/1
1 TABLET, FILM COATED ORAL 2 TIMES DAILY
Qty: 14 TABLET | Refills: 0 | Status: SHIPPED | OUTPATIENT
Start: 2023-04-06 | End: 2023-04-13

## 2023-04-06 RX ORDER — ONDANSETRON 4 MG/1
4 TABLET, FILM COATED ORAL EVERY 8 HOURS PRN
Qty: 12 TABLET | Refills: 0 | Status: SHIPPED | OUTPATIENT
Start: 2023-04-06 | End: 2023-04-10

## 2023-04-06 NOTE — TELEPHONE ENCOUNTER
Artamara Isaac called stating she now has diarrhea and vomiting. She was coming in today for her ear being blocked and being congested. Stated she cannot come in as she is vomiting. As per our conversation Idid inform her of the [facility] and you will call something in. Stated for nausea and her head.   [address] Deann Jones

## 2023-06-12 ENCOUNTER — TELEPHONE (OUTPATIENT)
Dept: INTERNAL MEDICINE CLINIC | Facility: CLINIC | Age: 38
End: 2023-06-12

## 2023-06-12 DIAGNOSIS — F41.9 ANXIETY: Primary | ICD-10-CM

## 2023-06-12 RX ORDER — ESCITALOPRAM OXALATE 20 MG/1
20 TABLET ORAL DAILY
Qty: 90 TABLET | Refills: 3 | Status: SHIPPED | OUTPATIENT
Start: 2023-06-12

## 2023-06-12 NOTE — TELEPHONE ENCOUNTER
Please send new scipt for lexapro 20mg as you two discussed as per patient last visit    Send to [facility] script 90 day please

## 2023-12-20 DIAGNOSIS — E03.9 HYPOTHYROIDISM, UNSPECIFIED TYPE: ICD-10-CM

## 2023-12-20 RX ORDER — LEVOTHYROXINE SODIUM 0.05 MG/1
50 TABLET ORAL DAILY
Qty: 90 TABLET | Refills: 3 | Status: SHIPPED | OUTPATIENT
Start: 2023-12-20

## 2023-12-28 DIAGNOSIS — L30.9 ECZEMA, UNSPECIFIED TYPE: ICD-10-CM

## 2023-12-28 DIAGNOSIS — J45.20 MILD INTERMITTENT ASTHMA WITHOUT COMPLICATION: ICD-10-CM

## 2023-12-29 RX ORDER — TRIAMCINOLONE ACETONIDE 1 MG/G
CREAM TOPICAL 2 TIMES DAILY
Qty: 453.6 G | Refills: 1 | Status: SHIPPED | OUTPATIENT
Start: 2023-12-29

## 2023-12-29 RX ORDER — ALBUTEROL SULFATE 90 UG/1
AEROSOL, METERED RESPIRATORY (INHALATION)
Qty: 25.5 G | Refills: 0 | Status: SHIPPED | OUTPATIENT
Start: 2023-12-29

## 2024-01-04 ENCOUNTER — OFFICE VISIT (OUTPATIENT)
Dept: INTERNAL MEDICINE CLINIC | Facility: CLINIC | Age: 39
End: 2024-01-04
Payer: COMMERCIAL

## 2024-01-04 VITALS — DIASTOLIC BLOOD PRESSURE: 80 MMHG | TEMPERATURE: 98.6 F | OXYGEN SATURATION: 98 % | SYSTOLIC BLOOD PRESSURE: 138 MMHG

## 2024-01-04 DIAGNOSIS — K52.9 GASTROENTERITIS: Primary | ICD-10-CM

## 2024-01-04 LAB
SARS-COV-2 AG UPPER RESP QL IA: NEGATIVE
SL AMB POCT RAPID FLU A: NEGATIVE
SL AMB POCT RAPID FLU B: NEGATIVE
VALID CONTROL: NORMAL

## 2024-01-04 PROCEDURE — 87811 SARS-COV-2 COVID19 W/OPTIC: CPT | Performed by: NURSE PRACTITIONER

## 2024-01-04 PROCEDURE — 99214 OFFICE O/P EST MOD 30 MIN: CPT | Performed by: NURSE PRACTITIONER

## 2024-01-04 PROCEDURE — 87804 INFLUENZA ASSAY W/OPTIC: CPT | Performed by: NURSE PRACTITIONER

## 2024-01-04 NOTE — LETTER
January 4, 2024    Patient:  Kortney Cummins  YOB: 1985  Date of Last Encounter: 1/4/2024    To whom it may concern:    Kortney Cummins is under my professional care.  She was seen in our office today.  Kortney may return to work 1/5/2024.    If you have any questions or concerns, please feel free to contact our office.      Sincerely,        CIRILO Keys

## 2024-01-04 NOTE — PROGRESS NOTES
Name: Kortney Cummins      : 1985      MRN: [id number]  Encounter Provider: CIRILO Marie  Encounter Date: 2024   Encounter department: [facility]    Assessment & Plan Rapid covid and flu negative. Stay well hydrated and continue with BRAT diet. Will bring back for a routine follow up.      1. Gastroenteritis  -     POCT Rapid Covid Ag  -     POCT rapid flu A and B           Subjective      Kortney is for an acute visit. She since New Years day has had diarrhea, headache, chills and body aches. She did a home covid test which was normal. She feels better today but is feeling anxious and is getting a rash on her neck and face at times. She is getting more headaches and feels the topamax is not helping anymore. She offers no other issues.      Review of Systems   Gastrointestinal:  Positive for diarrhea.   Skin:  Positive for rash.   Neurological:  Positive for headaches.   All other systems reviewed and are negative.      Current Outpatient Medications on File Prior to Visit   Medication Sig    albuterol (PROVENTIL HFA,VENTOLIN HFA) 90 mcg/act inhaler inhale 2 puffs every 6 hours if needed for wheezing or shortness of breath    ALPRAZolam (XANAX) 0.25 mg tablet Take 1 tablet (0.25 mg total) by mouth daily at bedtime as needed for anxiety    betamethasone dipropionate (DIPROSONE) 0.05 % cream Apply topically 2 (two) times a day    escitalopram (LEXAPRO) 20 mg tablet Take 1 tablet (20 mg total) by mouth daily    levonorgestrel (MIRENA) 20 MCG/24HR IUD 1 each by Intrauterine route    levothyroxine 50 mcg tablet TAKE 1 TABLET DAILY    ondansetron (ZOFRAN) 4 mg tablet Take 1 tablet (4 mg total) by mouth every 8 (eight) hours as needed for nausea or vomiting for up to 4 days    topiramate (TOPAMAX) 100 mg tablet Take 1 tablet (100 mg total) by mouth daily    triamcinolone (KENALOG) 0.1 % cream Apply topically 2 (two) times a day       Objective     Temp 98.6 °F (37 °C)   SpO2  98%     Physical Exam  Constitutional:       Appearance: Normal appearance. She is normal weight.   HENT:      Right Ear: Tympanic membrane, ear canal and external ear normal.      Left Ear: Tympanic membrane, ear canal and external ear normal.      Nose: Nose normal.      Mouth/Throat:      Mouth: Mucous membranes are moist.      Pharynx: Oropharynx is clear.   Cardiovascular:      Rate and Rhythm: Normal rate and regular rhythm.      Pulses: Normal pulses.      Heart sounds: Normal heart sounds.   Pulmonary:      Effort: Pulmonary effort is normal.      Breath sounds: Normal breath sounds.   Skin:     General: Skin is warm and dry.      Capillary Refill: Capillary refill takes less than 2 seconds.   Neurological:      General: No focal deficit present.      Mental Status: She is alert and oriented to person, place, and time. Mental status is at baseline.   Psychiatric:         Mood and Affect: Mood normal.         Behavior: Behavior normal.         Thought Content: Thought content normal.         Judgment: Judgment normal.       CIRILO Marie

## 2024-01-11 ENCOUNTER — OFFICE VISIT (OUTPATIENT)
Dept: INTERNAL MEDICINE CLINIC | Facility: CLINIC | Age: 39
End: 2024-01-11
Payer: COMMERCIAL

## 2024-01-11 VITALS
BODY MASS INDEX: 31.58 KG/M2 | SYSTOLIC BLOOD PRESSURE: 128 MMHG | HEART RATE: 78 BPM | WEIGHT: 184 LBS | DIASTOLIC BLOOD PRESSURE: 70 MMHG | OXYGEN SATURATION: 98 % | TEMPERATURE: 98.1 F

## 2024-01-11 DIAGNOSIS — E03.9 HYPOTHYROIDISM, UNSPECIFIED TYPE: ICD-10-CM

## 2024-01-11 DIAGNOSIS — R51.9 FREQUENT HEADACHES: ICD-10-CM

## 2024-01-11 DIAGNOSIS — Z13.21 ENCOUNTER FOR VITAMIN DEFICIENCY SCREENING: ICD-10-CM

## 2024-01-11 DIAGNOSIS — H53.8 BLURRED VISION: ICD-10-CM

## 2024-01-11 DIAGNOSIS — Z00.00 ROUTINE ADULT HEALTH MAINTENANCE: Primary | ICD-10-CM

## 2024-01-11 DIAGNOSIS — F32.A ANXIETY AND DEPRESSION: ICD-10-CM

## 2024-01-11 DIAGNOSIS — Z13.6 SCREENING FOR CARDIOVASCULAR CONDITION: ICD-10-CM

## 2024-01-11 DIAGNOSIS — F41.9 ANXIETY AND DEPRESSION: ICD-10-CM

## 2024-01-11 PROBLEM — K52.9 GASTROENTERITIS: Status: RESOLVED | Noted: 2024-01-04 | Resolved: 2024-01-11

## 2024-01-11 PROCEDURE — 99396 PREV VISIT EST AGE 40-64: CPT | Performed by: NURSE PRACTITIONER

## 2024-01-11 RX ORDER — DULOXETIN HYDROCHLORIDE 30 MG/1
30 CAPSULE, DELAYED RELEASE ORAL DAILY
Qty: 30 CAPSULE | Refills: 0 | Status: SHIPPED | OUTPATIENT
Start: 2024-01-11

## 2024-01-11 NOTE — PROGRESS NOTES
Name: Kortney Cummins      : 1985      MRN: [id number]  Encounter Provider: CIRILO Marie  Encounter Date: 2024   Encounter department: [facility]    Assessment & Plan Will wean off lexapro and start on Cymbalta. Will repeat fasting labs. Will order imaging of the brain due to past family history and symptoms. Deferring flu and covid vaccines. Will follow up in one month or sooner if need be.     1. Routine adult health maintenance    2. Hypothyroidism, unspecified type  -     Comprehensive metabolic panel; Future  -     CBC and differential; Future  -     TSH, 3rd generation with Free T4 reflex; Future    3. Anxiety and depression  -     DULoxetine (CYMBALTA) 30 mg delayed release capsule; Take 1 capsule (30 mg total) by mouth daily  -     Comprehensive metabolic panel; Future  -     CBC and differential; Future    4. Screening for cardiovascular condition  -     Lipid panel; Future    5. Frequent headaches  -     MRI brain wo contrast; Future; Expected date: 2024    6. Blurred vision  -     MRI brain wo contrast; Future; Expected date: 2024    7. Encounter for vitamin deficiency screening  -     Vitamin D 25 hydroxy; Future  -     Vitamin B12; Future        Depression Screening and Follow-up Plan: Patient declines further evaluation by mental health professional and/or medications. Brief counseling provided. Will re-evaluate at next office visit.         Subjective      Kortney is for a wellness. She is having a lot of anxiety and feels the Lexapro is not helping. She is having some stressors at home and would like to try another medication. She also is having worsening headaches and feels the topamax is not helping. She is having the pain in the back of the head and is taking Motrin all the time. She also does try Excedrin. She states her younger sister did pass away from a brain tumor at the age of 13. She does get concerned about this. She is deferring  flu and covid vaccines. She has not had labs done in some time. She offers no other issues.      Review of Systems   Neurological:  Positive for headaches.   All other systems reviewed and are negative.      Current Outpatient Medications on File Prior to Visit   Medication Sig    albuterol (PROVENTIL HFA,VENTOLIN HFA) 90 mcg/act inhaler inhale 2 puffs every 6 hours if needed for wheezing or shortness of breath    betamethasone dipropionate (DIPROSONE) 0.05 % cream Apply topically 2 (two) times a day    levonorgestrel (MIRENA) 20 MCG/24HR IUD 1 each by Intrauterine route    levothyroxine 50 mcg tablet TAKE 1 TABLET DAILY    topiramate (TOPAMAX) 100 mg tablet Take 1 tablet (100 mg total) by mouth daily    triamcinolone (KENALOG) 0.1 % cream Apply topically 2 (two) times a day    [DISCONTINUED] ALPRAZolam (XANAX) 0.25 mg tablet Take 1 tablet (0.25 mg total) by mouth daily at bedtime as needed for anxiety    [DISCONTINUED] escitalopram (LEXAPRO) 20 mg tablet Take 1 tablet (20 mg total) by mouth daily    ondansetron (ZOFRAN) 4 mg tablet Take 1 tablet (4 mg total) by mouth every 8 (eight) hours as needed for nausea or vomiting for up to 4 days (Patient not taking: Reported on 1/11/2024)       Objective     /70   Pulse 78   Temp 98.1 °F (36.7 °C) (Temporal)   Wt 83.5 kg (184 lb)   SpO2 98%   BMI 31.58 kg/m²     Physical Exam  Vitals reviewed.   Constitutional:       Appearance: Normal appearance. She is normal weight.   HENT:      Head: Normocephalic and atraumatic.      Right Ear: Tympanic membrane, ear canal and external ear normal.      Left Ear: Tympanic membrane, ear canal and external ear normal.      Nose: Nose normal.      Mouth/Throat:      Mouth: Mucous membranes are moist.      Pharynx: Oropharynx is clear.   Eyes:      Extraocular Movements: Extraocular movements intact.      Conjunctiva/sclera: Conjunctivae normal.      Pupils: Pupils are equal, round, and reactive to light.   Cardiovascular:       Rate and Rhythm: Normal rate and regular rhythm.      Pulses: Normal pulses.      Heart sounds: Normal heart sounds.   Pulmonary:      Effort: Pulmonary effort is normal.      Breath sounds: Normal breath sounds.   Abdominal:      General: Abdomen is flat. Bowel sounds are normal.      Palpations: Abdomen is soft.   Musculoskeletal:         General: Normal range of motion.      Cervical back: Normal range of motion and neck supple.   Skin:     General: Skin is warm and dry.      Capillary Refill: Capillary refill takes less than 2 seconds.   Neurological:      General: No focal deficit present.      Mental Status: She is alert and oriented to person, place, and time. Mental status is at baseline.   Psychiatric:         Mood and Affect: Mood normal.         Behavior: Behavior normal.         Thought Content: Thought content normal.         Judgment: Judgment normal.       CIRILO Marie

## 2024-01-19 ENCOUNTER — HOSPITAL ENCOUNTER (OUTPATIENT)
Dept: MRI IMAGING | Facility: HOSPITAL | Age: 39
Discharge: HOME | End: 2024-01-19
Payer: COMMERCIAL

## 2024-01-19 ENCOUNTER — APPOINTMENT (OUTPATIENT)
Dept: LAB | Facility: HOSPITAL | Age: 39
End: 2024-01-19
Payer: COMMERCIAL

## 2024-01-19 DIAGNOSIS — H53.8 BLURRED VISION: ICD-10-CM

## 2024-01-19 DIAGNOSIS — F41.9 ANXIETY AND DEPRESSION: ICD-10-CM

## 2024-01-19 DIAGNOSIS — F32.A ANXIETY AND DEPRESSION: ICD-10-CM

## 2024-01-19 DIAGNOSIS — E03.9 HYPOTHYROIDISM, UNSPECIFIED TYPE: ICD-10-CM

## 2024-01-19 DIAGNOSIS — Z13.6 SCREENING FOR CARDIOVASCULAR CONDITION: ICD-10-CM

## 2024-01-19 DIAGNOSIS — Z13.21 ENCOUNTER FOR VITAMIN DEFICIENCY SCREENING: ICD-10-CM

## 2024-01-19 DIAGNOSIS — R51.9 FREQUENT HEADACHES: ICD-10-CM

## 2024-01-19 LAB
25(OH)D3 SERPL-MCNC: 23.6 NG/ML (ref 30–100)
ALBUMIN SERPL BCP-MCNC: 4.4 G/DL (ref 3.5–5)
ALP SERPL-CCNC: 67 U/L (ref 34–104)
ALT SERPL W P-5'-P-CCNC: 23 U/L (ref 7–52)
ANION GAP SERPL CALCULATED.3IONS-SCNC: 6 MMOL/L
AST SERPL W P-5'-P-CCNC: 18 U/L (ref 13–39)
BASOPHILS # BLD AUTO: 0.05 THOUSANDS/ÂΜL (ref 0–0.1)
BASOPHILS NFR BLD AUTO: 1 % (ref 0–1)
BILIRUB SERPL-MCNC: 0.56 MG/DL (ref 0.2–1)
BUN SERPL-MCNC: 11 MG/DL (ref 5–25)
CALCIUM SERPL-MCNC: 9.6 MG/DL (ref 8.4–10.2)
CHLORIDE SERPL-SCNC: 108 MMOL/L (ref 96–108)
CHOLEST SERPL-MCNC: 158 MG/DL
CO2 SERPL-SCNC: 25 MMOL/L (ref 21–32)
CREAT SERPL-MCNC: 0.89 MG/DL (ref 0.6–1.3)
EOSINOPHIL # BLD AUTO: 0.16 THOUSAND/ÂΜL (ref 0–0.61)
EOSINOPHIL NFR BLD AUTO: 2 % (ref 0–6)
ERYTHROCYTE [DISTWIDTH] IN BLOOD BY AUTOMATED COUNT: 13.2 % (ref 11.6–15.1)
GFR SERPL CREATININE-BSD FRML MDRD: 81 ML/MIN/1.73SQ M
GLUCOSE SERPL-MCNC: 92 MG/DL (ref 65–140)
HCT VFR BLD AUTO: 46.1 % (ref 34.8–46.1)
HDLC SERPL-MCNC: 45 MG/DL
HGB BLD-MCNC: 14.3 G/DL (ref 11.5–15.4)
IMM GRANULOCYTES # BLD AUTO: 0.02 THOUSAND/UL (ref 0–0.2)
IMM GRANULOCYTES NFR BLD AUTO: 0 % (ref 0–2)
LDLC SERPL CALC-MCNC: 83 MG/DL (ref 0–100)
LYMPHOCYTES # BLD AUTO: 1.59 THOUSANDS/ÂΜL (ref 0.6–4.47)
LYMPHOCYTES NFR BLD AUTO: 21 % (ref 14–44)
MCH RBC QN AUTO: 32 PG (ref 26.8–34.3)
MCHC RBC AUTO-ENTMCNC: 31 G/DL (ref 31.4–37.4)
MCV RBC AUTO: 103 FL (ref 82–98)
MONOCYTES # BLD AUTO: 0.48 THOUSAND/ÂΜL (ref 0.17–1.22)
MONOCYTES NFR BLD AUTO: 6 % (ref 4–12)
NEUTROPHILS # BLD AUTO: 5.37 THOUSANDS/ÂΜL (ref 1.85–7.62)
NEUTS SEG NFR BLD AUTO: 70 % (ref 43–75)
NONHDLC SERPL-MCNC: 113 MG/DL
NRBC BLD AUTO-RTO: 0 /100 WBCS
PLATELET # BLD AUTO: 336 THOUSANDS/UL (ref 149–390)
PMV BLD AUTO: 8.8 FL (ref 8.9–12.7)
POTASSIUM SERPL-SCNC: 4 MMOL/L (ref 3.5–5.3)
PROT SERPL-MCNC: 7.1 G/DL (ref 6.4–8.4)
RBC # BLD AUTO: 4.47 MILLION/UL (ref 3.81–5.12)
SODIUM SERPL-SCNC: 139 MMOL/L (ref 135–147)
TRIGL SERPL-MCNC: 149 MG/DL
TSH SERPL DL<=0.05 MIU/L-ACNC: 1.81 UIU/ML (ref 0.45–4.5)
VIT B12 SERPL-MCNC: 359 PG/ML (ref 180–914)
WBC # BLD AUTO: 7.67 THOUSAND/UL (ref 4.31–10.16)

## 2024-01-19 PROCEDURE — 82306 VITAMIN D 25 HYDROXY: CPT

## 2024-01-19 PROCEDURE — 82607 VITAMIN B-12: CPT

## 2024-01-19 PROCEDURE — 80053 COMPREHEN METABOLIC PANEL: CPT

## 2024-01-19 PROCEDURE — 85025 COMPLETE CBC W/AUTO DIFF WBC: CPT

## 2024-01-19 PROCEDURE — G1004 CDSM NDSC: HCPCS

## 2024-01-19 PROCEDURE — 80061 LIPID PANEL: CPT

## 2024-01-19 PROCEDURE — 70551 MRI BRAIN STEM W/O DYE: CPT

## 2024-01-19 PROCEDURE — 84443 ASSAY THYROID STIM HORMONE: CPT

## 2024-01-19 PROCEDURE — 36415 COLL VENOUS BLD VENIPUNCTURE: CPT

## 2024-01-31 DIAGNOSIS — E55.9 VITAMIN D DEFICIENCY: Primary | ICD-10-CM

## 2024-01-31 RX ORDER — ERGOCALCIFEROL 1.25 MG/1
50000 CAPSULE ORAL WEEKLY
Qty: 4 CAPSULE | Refills: 3 | Status: SHIPPED | OUTPATIENT
Start: 2024-01-31

## 2024-02-01 DIAGNOSIS — F32.A ANXIETY AND DEPRESSION: ICD-10-CM

## 2024-02-01 DIAGNOSIS — F41.9 ANXIETY AND DEPRESSION: ICD-10-CM

## 2024-02-01 RX ORDER — DULOXETIN HYDROCHLORIDE 30 MG/1
30 CAPSULE, DELAYED RELEASE ORAL DAILY
Qty: 30 CAPSULE | Refills: 0 | Status: SHIPPED | OUTPATIENT
Start: 2024-02-01 | End: 2024-02-08 | Stop reason: SDUPTHER

## 2024-02-08 ENCOUNTER — OFFICE VISIT (OUTPATIENT)
Dept: INTERNAL MEDICINE CLINIC | Facility: CLINIC | Age: 39
End: 2024-02-08
Payer: COMMERCIAL

## 2024-02-08 DIAGNOSIS — F41.9 ANXIETY AND DEPRESSION: ICD-10-CM

## 2024-02-08 DIAGNOSIS — R51.9 FREQUENT HEADACHES: ICD-10-CM

## 2024-02-08 DIAGNOSIS — F32.A ANXIETY AND DEPRESSION: ICD-10-CM

## 2024-02-08 DIAGNOSIS — F41.0 PANIC ATTACKS: Primary | ICD-10-CM

## 2024-02-08 PROCEDURE — 99213 OFFICE O/P EST LOW 20 MIN: CPT | Performed by: NURSE PRACTITIONER

## 2024-02-08 RX ORDER — DULOXETIN HYDROCHLORIDE 30 MG/1
30 CAPSULE, DELAYED RELEASE ORAL DAILY
Qty: 30 CAPSULE | Refills: 0 | Status: SHIPPED | OUTPATIENT
Start: 2024-02-08

## 2024-02-08 NOTE — PROGRESS NOTES
Name: Kortney Cummins      : 1985      MRN: [id number]  Encounter Provider: CIRILO Marie  Encounter Date: 2024   Encounter department: [facility]    Assessment & Plan Continue on Cymbalta. Will give referral to Neurology did review MRI results with patient. Will follow back up in 3 months or sooner if need be.     1. Panic attacks    2. Anxiety and depression  -     DULoxetine (CYMBALTA) 30 mg delayed release capsule; Take 1 capsule (30 mg total) by mouth daily    3. Frequent headaches  -     Ambulatory Referral to Neurology; Future           Subjective      Kortney is for a one month follow up. She is doing better on the Cymbalta and is having more energy. She is having a hard time sleeping at night and can not shut her mind off. She does feel less anxious and is wanting to continue on this. She is having less headaches and is not sure if she does want to see Neurology. She is taking Vitamin D and B. She offers no other issues.      Review of Systems   All other systems reviewed and are negative.      Current Outpatient Medications on File Prior to Visit   Medication Sig    albuterol (PROVENTIL HFA,VENTOLIN HFA) 90 mcg/act inhaler inhale 2 puffs every 6 hours if needed for wheezing or shortness of breath    betamethasone dipropionate (DIPROSONE) 0.05 % cream Apply topically 2 (two) times a day    ergocalciferol (VITAMIN D2) 50,000 units Take 1 capsule (50,000 Units total) by mouth once a week    levonorgestrel (MIRENA) 20 MCG/24HR IUD 1 each by Intrauterine route    levothyroxine 50 mcg tablet TAKE 1 TABLET DAILY    ondansetron (ZOFRAN) 4 mg tablet Take 1 tablet (4 mg total) by mouth every 8 (eight) hours as needed for nausea or vomiting for up to 4 days (Patient not taking: Reported on 2024)    topiramate (TOPAMAX) 100 mg tablet Take 1 tablet (100 mg total) by mouth daily    triamcinolone (KENALOG) 0.1 % cream Apply topically 2 (two) times a day     [DISCONTINUED] DULoxetine (CYMBALTA) 30 mg delayed release capsule take 1 capsule by mouth once daily       Objective     There were no vitals taken for this visit.    Physical Exam  Constitutional:       Appearance: Normal appearance.   Neurological:      Mental Status: She is alert.   Psychiatric:         Mood and Affect: Mood normal.         Behavior: Behavior normal.         Thought Content: Thought content normal.         Judgment: Judgment normal.       CIRILO Marie

## 2024-02-12 DIAGNOSIS — R51.9 FREQUENT HEADACHES: ICD-10-CM

## 2024-02-12 RX ORDER — TOPIRAMATE 100 MG/1
100 TABLET, FILM COATED ORAL DAILY
Qty: 90 TABLET | Refills: 3 | Status: SHIPPED | OUTPATIENT
Start: 2024-02-12

## 2024-02-21 PROBLEM — Z13.6 SCREENING FOR CARDIOVASCULAR CONDITION: Status: RESOLVED | Noted: 2020-08-24 | Resolved: 2024-02-21

## 2024-02-21 PROBLEM — Z11.1 PPD SCREENING TEST: Status: RESOLVED | Noted: 2020-08-24 | Resolved: 2024-02-21

## 2024-02-21 PROBLEM — Z00.00 ROUTINE ADULT HEALTH MAINTENANCE: Status: RESOLVED | Noted: 2020-08-24 | Resolved: 2024-02-21

## 2024-02-29 DIAGNOSIS — F41.9 ANXIETY AND DEPRESSION: ICD-10-CM

## 2024-02-29 DIAGNOSIS — F32.A ANXIETY AND DEPRESSION: ICD-10-CM

## 2024-02-29 RX ORDER — DULOXETIN HYDROCHLORIDE 30 MG/1
30 CAPSULE, DELAYED RELEASE ORAL DAILY
Qty: 30 CAPSULE | Refills: 0 | Status: SHIPPED | OUTPATIENT
Start: 2024-02-29

## 2024-03-14 PROBLEM — Z13.21 ENCOUNTER FOR VITAMIN DEFICIENCY SCREENING: Status: RESOLVED | Noted: 2024-01-11 | Resolved: 2024-03-14

## 2024-03-18 ENCOUNTER — TELEPHONE (OUTPATIENT)
Dept: NEUROLOGY | Facility: CLINIC | Age: 39
End: 2024-03-18

## 2024-04-02 DIAGNOSIS — F41.9 ANXIETY AND DEPRESSION: ICD-10-CM

## 2024-04-02 DIAGNOSIS — F32.A ANXIETY AND DEPRESSION: ICD-10-CM

## 2024-04-02 RX ORDER — DULOXETIN HYDROCHLORIDE 30 MG/1
30 CAPSULE, DELAYED RELEASE ORAL DAILY
Qty: 30 CAPSULE | Refills: 3 | Status: SHIPPED | OUTPATIENT
Start: 2024-04-02

## 2024-04-03 ENCOUNTER — CONSULT (OUTPATIENT)
Dept: NEUROLOGY | Facility: CLINIC | Age: 39
End: 2024-04-03
Payer: COMMERCIAL

## 2024-04-03 VITALS
HEIGHT: 64 IN | BODY MASS INDEX: 31.55 KG/M2 | TEMPERATURE: 97.5 F | DIASTOLIC BLOOD PRESSURE: 84 MMHG | WEIGHT: 184.8 LBS | SYSTOLIC BLOOD PRESSURE: 120 MMHG | HEART RATE: 85 BPM

## 2024-04-03 DIAGNOSIS — G44.229 CHRONIC TENSION TYPE HEADACHE: Primary | ICD-10-CM

## 2024-04-03 PROCEDURE — 99205 OFFICE O/P NEW HI 60 MIN: CPT

## 2024-04-03 RX ORDER — SUMATRIPTAN 100 MG/1
100 TABLET, FILM COATED ORAL AS NEEDED
Qty: 27 TABLET | Refills: 1 | Status: SHIPPED | OUTPATIENT
Start: 2024-04-03

## 2024-04-03 RX ORDER — AMITRIPTYLINE HYDROCHLORIDE 10 MG/1
TABLET, FILM COATED ORAL
Qty: 150 TABLET | Refills: 0 | Status: SHIPPED | OUTPATIENT
Start: 2024-04-03

## 2024-04-03 RX ORDER — TOPIRAMATE 25 MG/1
TABLET ORAL
Qty: 42 TABLET | Refills: 0 | Status: SHIPPED | OUTPATIENT
Start: 2024-04-03

## 2024-04-03 NOTE — PATIENT INSTRUCTIONS
Headache/migraine treatment:   - When you have a moderate to severe headache, you should seek rest, initiate relaxation and apply cold compresses to the head.      Abortive medications (for immediate treatment of a headache):   It is ok to take ibuprofen, acetaminophen or naproxen (Advil, Tylenol,  Aleve, Excedrin) if they help your headaches you should limit these to no more than 3 times a week to avoid medication overuse/rebound headaches.     Take sumatriptan 100 mg at the onset of a headache, may repeat once in 2 hours if needed. Max 3 times per week.      Over the counter preventive supplements for headaches/migraines   (can continue during breastfeeding)  (to take every day to help prevent headaches - not to take at the time of headache):  [x] Magnesium 500 mg 1-2 daily (If any diarrhea or upset stomach, decrease dose  as tolerated)  [x] Riboflavin (Vitamin B2) 400mg daily (FYI B2 may make your urine bright/neon yellow)     Prescription preventive medications for headaches/migraines   (to take every day to help prevent headaches - not to take at the time of headache):    Decrease Topiramate - Take 75 mg at bedtime x 1 week then 50 mg at bedtime x 1 week, then 25 mg at bedtime x 1 week then stop   Once at 50 mg of Topiramate, start Amitriptyline 10 mg x 2 weeks, then increase by 10 mg every 2 weeks until at the goal dose of 50 mg; stopping at the lowest effective dose.     *Typically these types of medications take time untill you see the benefit, although some may see improvement in days, often it may take weeks, especially if the medication is being titrated up to a beneficial level. Please contact us if there are any concerns or questions regarding the medication.      Self-Monitoring:  [x] Headache calendar. Each day dori a number from 0-10 indicating if there was a headache and how bad it was.  This can be used to monitor gradual improvement and is helpful to make medication adjustments. You can do this on  "paper or there is an GENNY for a smart phone called \"Migraine e Diary\".      Lifestyle Recommendations:  [x] SLEEP - Maintain a regular sleep schedule: Adults need at least 7-8 hours of uninterrupted a night. Maintain good sleep hygiene:  Going to bed and waking up at consistent times, avoiding excessive daytime naps, avoiding caffeinated beverages in the evening, avoid excessive stimulation in the evening and generally using bed primarily for sleeping.  One hour before bedtime would recommend turning lights down lower, decreasing your activity (may read quietly, listen to music at a low volume). When you get into bed, should eliminate all technology (no texting, emailing, playing with your phone, iPad or tablet in bed).  [x] HYDRATION - Maintain good hydration.  Drink  2L of fluid a day (4 typical small water bottles)  [x] DIET - Maintain good nutrition. In particular don't skip meals and try and eat healthy balanced meals regularly.  [x] TRIGGERS - Look for other triggers and avoid them: Limit caffeine to 1-2 cups a day or less. Avoid dietary triggers that you have noticed bring on your headaches (this could include aged cheese, peanuts, MSG, aspartame and nitrates).  [x] EXERCISE - physical exercise as we all know is good for you in many ways, and not only is good for your heart, but also is beneficial for your mental health, cognitive health and  chronic pain/headaches. I would encourage at the least 5 days of physical exercise weekly for at least 30 minutes.   [x] TOBACCO CESSATION: Patient was educated regarding the impact of smoking and advised to quit. Willingness to quit was assessed and she is cutting back  Resources provided: [place] Quit Line 4-951-QUIT-NOW, [place]     Education and Follow-up  [x] Please call with any questions or concerns. Of course if any new concerning symptoms go to the emergency department.  [x] Follow up in 3 months, sooner if needed  [x] Complete labs  [x] Schedule to see " Ophthalmology

## 2024-04-03 NOTE — PROGRESS NOTES
Patient ID: Kortney Cummins is a 38 y.o. female.    Assessment/Plan:    Chronic tension type headache  Kortney Cummins is a 38 year old female with frequent frontal headaches which lack any migrainous features, with the exception of blurred vision although she reports this occurs with and without a headache. Her headaches are most consistent with chronic tension type headaches, exacerbated by her uncontrolled anxiety, depression and in times of high stress. In the past there may have also been a component of medication over use headache but recently she has not been using OTCs such as ibuprofen or excedrin excessively. She has not found her current regimen of Topiramate to be effective, therefore we will have her taper off of this. We did discuss other preventative options and discussed risks vs benefits. She is agreeable to a trial of Amitriptyline starting at 10 mg and increasing to a max initial dose of 50 mg; stopping at the lowest effective dose. She will also trial Sumatriptan as needed. I have advised her to initiate vitamin supplementation with magnesium and riboflavin and discussed other lifestyle recommendations including smoking cessation. I have advised her to schedule to see Ophthalmology given her visual complaints with and without headache. She will complete serum work up to rule out secondary etiologies (Lyme/ADELE) and will follow up in 3 months; sooner if needed.     Plan:  Headache/migraine treatment:   - When you have a moderate to severe headache, you should seek rest, initiate relaxation and apply cold compresses to the head.      Abortive medications (for immediate treatment of a headache):   It is ok to take ibuprofen, acetaminophen or naproxen (Advil, Tylenol,  Aleve, Excedrin) if they help your headaches you should limit these to no more than 3 times a week to avoid medication overuse/rebound headaches.     Take sumatriptan 100 mg at the onset of a headache, may repeat once in 2 hours if needed.  "Max 3 times per week.      Over the counter preventive supplements for headaches/migraines   (can continue during breastfeeding)  (to take every day to help prevent headaches - not to take at the time of headache):  [x] Magnesium 500 mg 1-2 daily (If any diarrhea or upset stomach, decrease dose  as tolerated)  [x] Riboflavin (Vitamin B2) 400mg daily (FYI B2 may make your urine bright/neon yellow)     Prescription preventive medications for headaches/migraines   (to take every day to help prevent headaches - not to take at the time of headache):    Decrease Topiramate - Take 75 mg at bedtime x 1 week then 50 mg at bedtime x 1 week, then 25 mg at bedtime x 1 week then stop   Once at 50 mg of Topiramate, start Amitriptyline 10 mg x 2 weeks, then increase by 10 mg every 2 weeks until at the goal dose of 50 mg; stopping at the lowest effective dose.     *Typically these types of medications take time untill you see the benefit, although some may see improvement in days, often it may take weeks, especially if the medication is being titrated up to a beneficial level. Please contact us if there are any concerns or questions regarding the medication.      Self-Monitoring:  [x] Headache calendar. Each day dori a number from 0-10 indicating if there was a headache and how bad it was.  This can be used to monitor gradual improvement and is helpful to make medication adjustments. You can do this on paper or there is an GENNY for a smart phone called \"Migraine e Diary\".      Lifestyle Recommendations:  [x] SLEEP - Maintain a regular sleep schedule: Adults need at least 7-8 hours of uninterrupted a night. Maintain good sleep hygiene:  Going to bed and waking up at consistent times, avoiding excessive daytime naps, avoiding caffeinated beverages in the evening, avoid excessive stimulation in the evening and generally using bed primarily for sleeping.  One hour before bedtime would recommend turning lights down lower, decreasing your " activity (may read quietly, listen to music at a low volume). When you get into bed, should eliminate all technology (no texting, emailing, playing with your phone, iPad or tablet in bed).  [x] HYDRATION - Maintain good hydration.  Drink  2L of fluid a day (4 typical small water bottles)  [x] DIET - Maintain good nutrition. In particular don't skip meals and try and eat healthy balanced meals regularly.  [x] TRIGGERS - Look for other triggers and avoid them: Limit caffeine to 1-2 cups a day or less. Avoid dietary triggers that you have noticed bring on your headaches (this could include aged cheese, peanuts, MSG, aspartame and nitrates).  [x] EXERCISE - physical exercise as we all know is good for you in many ways, and not only is good for your heart, but also is beneficial for your mental health, cognitive health and  chronic pain/headaches. I would encourage at the least 5 days of physical exercise weekly for at least 30 minutes.   [x] TOBACCO CESSATION: Patient was educated regarding the impact of smoking and advised to quit. Willingness to quit was assessed and she is cutting back  Resources provided: [place] Quit Line 1-136-QUIT-NOW, [place]     Education and Follow-up  [x] Please call with any questions or concerns. Of course if any new concerning symptoms go to the emergency department.  [x] Follow up in 3 months, sooner if needed  [x] Complete labs  [x] Schedule to see Ophthalmology        Diagnoses and all orders for this visit:    Chronic tension type headache  -     Ambulatory Referral to Neurology  -     ADELE Screen w/ Reflex to Titer/Pattern; Future  -     Lyme Total AB W Reflex to IGM/IGG; Future  -     amitriptyline (ELAVIL) 10 mg tablet; start 10mg at bedtime x 2 weeks; Increase by 10mg every 2 weeks until good effect on headaches/pain or reach 50mg daily  -     topiramate (TOPAMAX) 25 mg tablet; Take 75 mg at bedtime x 1 week then 50 mg at bedtime x 1 week, then 25 mg at bedtime x 1 week then  stop  -     SUMAtriptan (IMITREX) 100 mg tablet; Take 1 tablet (100 mg total) by mouth as needed for migraine May repeat in 2 hours. Limit of 3/week or 9/month         I have spent a total time of 80 minutes on 04/03/24 in caring for this patient including Prognosis, Risks and benefits of tx options, Instructions for management, Patient and family education, Importance of tx compliance, Risk factor reductions, Impressions, Documenting in the medical record, Reviewing / ordering tests, medicine, procedures  , and Obtaining or reviewing history  .      Subjective:    MOLLY Cummins is a 38 year old female with a pmhx of allergies, asthma, hypothyroidism, eczema, endometritis, anxiety, panic attacks, depression, blurred vision, frequent headaches, menorrhagia, fatigue, and vitamin d deficiency. She presents to the office today referred by Tatyana Zheng C* for evaluation and treatment of her headaches.     She reports having headaches after the birth of her 7 year old child but they have become more frequent and severe over the last 1 year. She is presently maintained on Topiramate 100 mg daily for her headaches, as well as cymbalta 30 mg for her anxiety and reports using Excedrin migraine and ibuprofen as needed.    Description of Headaches:  Location of pain: left-sided unilateral, occipital, frontal  Radiation of pain?:anterior neck  Character of pain: pressure  Severity of pain: 8/10  Accompanying symptoms:blurred vision and spots in her vision (worse with a headache but is also occurring outside of having a headache)  Prodromal sx?: none  Rapidity of onset: gradual  Typical duration of individual headache: 6 hours  Frequency of headaches: was every day but not with using blue light glasses about 3 times per week  Are you ever headache free?no  Are most headaches similar in presentation? 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 no  Overall pattern since problem began: gradually improving    Degree of Functional Impairment: mild; is able to \"push through\"    Current Use of Meds to Treat HA:  Abortive meds? Ibuprofen 400 mg-800 mg 2-3 times a day, past 2-3 months has been using Excedrin migraine- helps a little   Daily use? No; no longer takes Ibuprofen and only used Excedrin prn   Preventive meds? Topiramate 100 mg at bedtime  Non-Medical/Alternative treatments for HA: no    Additional Relevant History:  History of head/neck trauma? no  History of head/neck surgery? no  Family h/o headache problems?no  Family history of aneurysms? no  Exposure to carbon monoxide? no  Substance use: alcohol: weekends socially, illicit drugs: none, tobacco: cigarettes 6/day (trying to quit over the last month), caffeine: coffee 1-2 cup daily  Water: 60 oz daily  Sleep: 7-8 hours a night  Mood: Anxiety>Depression; uncontrolled  Children/Pregnancy: 1 biological child and 1 step child. No plans for future pregnancy. Has IUD in place  Diet: eats lunch (11:30) and dinner, has coffee in the morning  Vitamins: She is taking B12 and Vitamin D replacements (recently started). Does not take any other multivitamins   Has not seen Ophthalmology     Prior Evaluation/Treatments:  Have you seen another physician for your headaches? no  Prior work-up: MRI brain 1/19/2024  Trigger point injections? no  Botox injections? no  Epidural injections? no  Prior PREVENTATIVE medications:  none; only topiramate  Prior ABORTIVE mediations: none    The following portions of the patient's history were reviewed and updated as appropriate: allergies, current medications, past family history, past medical history, past social history, past surgical history, and problem list.         Objective:    Blood " "pressure 120/84, pulse 85, temperature 97.5 °F (36.4 °C), temperature source Temporal, height 5' 4\" (1.626 m), weight 83.8 kg (184 lb 12.8 oz).    Physical Exam  Vitals reviewed.   Constitutional:       Appearance: Normal appearance. She is not toxic-appearing or diaphoretic.   HENT:      Head: Normocephalic and atraumatic.      Nose: Nose normal.      Mouth/Throat:      Mouth: Mucous membranes are moist.      Pharynx: Oropharynx is clear.   Eyes:      Extraocular Movements: Extraocular movements intact.      Conjunctiva/sclera: Conjunctivae normal.      Pupils: Pupils are equal, round, and reactive to light.   Cardiovascular:      Rate and Rhythm: Normal rate.      Pulses: Normal pulses.   Pulmonary:      Effort: Pulmonary effort is normal.   Musculoskeletal:         General: Normal range of motion.      Cervical back: Normal range of motion and neck supple.      Right lower leg: No edema.      Left lower leg: No edema.   Skin:     General: Skin is warm.   Neurological:      General: No focal deficit present.      Mental Status: She is alert and oriented to person, place, and time.      Cranial Nerves: No cranial nerve deficit.      Sensory: No sensory deficit.      Motor: No weakness.      Coordination: Coordination normal.      Gait: Gait normal.      Deep Tendon Reflexes: Reflexes normal.   Psychiatric:         Mood and Affect: Mood normal.         Behavior: Behavior normal.         Thought Content: Thought content normal.         Judgment: Judgment normal.       Neurological Examination  On neurological examination patient is alert, awake, oriented and in no distress. Speech is fluent without dysarthria or aphasia. Cranial nerves 2-12 were symmetrically intact bilaterally.  No evidence of any focal weakness or sensory loss in the upper or lower extremities. Motor testing reveals 5/5 strength of the bilateral upper and lower extremities.There was no pronator drift.  No fasciculations present. No abnormal " involuntary movements. Finger- to-nose reveals no tremor or ataxia and intact proprioceptive function, no dysmetria was noted. Rapid alternating movement normal. Sensation was intact to vibration, light touch, and temperature in bilateral upper and lower extremities. Deep tendon reflexes were 2+ and symmetric in the bilateral upper and lower extremities. She is able to rise easily without assistance from a seated position. Casual gait is normal including stance, stride, and arm swing. Normal tandem gait. Romberg is absent.        ROS:    Review of Systems   Constitutional:  Negative for appetite change, fatigue and fever.   HENT: Negative.  Negative for hearing loss, tinnitus, trouble swallowing and voice change.    Eyes:  Positive for visual disturbance (everyday). Negative for photophobia and pain.   Respiratory: Negative.  Negative for shortness of breath.    Cardiovascular: Negative.  Negative for palpitations.   Gastrointestinal: Negative.  Negative for nausea and vomiting.   Endocrine: Negative.  Negative for cold intolerance.   Genitourinary: Negative.  Negative for dysuria, frequency and urgency.   Musculoskeletal:  Negative for back pain, gait problem, myalgias, neck pain and neck stiffness.   Skin: Negative.  Negative for rash.   Allergic/Immunologic: Negative.    Neurological:  Positive for headaches (everyday migraines (end of last year till now)). Negative for dizziness, tremors, seizures, syncope, facial asymmetry, speech difficulty, weakness, light-headedness and numbness.   Hematological: Negative.  Does not bruise/bleed easily.   Psychiatric/Behavioral: Negative.  Negative for confusion, hallucinations and sleep disturbance.      Reviewed ROS as entered by medical assistant.

## 2024-04-07 PROBLEM — G44.229 CHRONIC TENSION TYPE HEADACHE: Status: ACTIVE | Noted: 2024-04-07

## 2024-04-08 NOTE — ASSESSMENT & PLAN NOTE
Kortney Cummins is a 38 year old female with frequent frontal headaches which lack any migrainous features, with the exception of blurred vision although she reports this occurs with and without a headache. Her headaches are most consistent with chronic tension type headaches, exacerbated by her uncontrolled anxiety, depression and in times of high stress. In the past there may have also been a component of medication over use headache but recently she has not been using OTCs such as ibuprofen or excedrin excessively. She has not found her current regimen of Topiramate to be effective, therefore we will have her taper off of this. We did discuss other preventative options and discussed risks vs benefits. She is agreeable to a trial of Amitriptyline starting at 10 mg and increasing to a max initial dose of 50 mg; stopping at the lowest effective dose. She will also trial Sumatriptan as needed. I have advised her to initiate vitamin supplementation with magnesium and riboflavin and discussed other lifestyle recommendations including smoking cessation. I have advised her to schedule to see Ophthalmology given her visual complaints with and without headache. She will complete serum work up to rule out secondary etiologies (Lyme/ADELE) and will follow up in 3 months; sooner if needed.     Plan:  Headache/migraine treatment:   - When you have a moderate to severe headache, you should seek rest, initiate relaxation and apply cold compresses to the head.      Abortive medications (for immediate treatment of a headache):   It is ok to take ibuprofen, acetaminophen or naproxen (Advil, Tylenol,  Aleve, Excedrin) if they help your headaches you should limit these to no more than 3 times a week to avoid medication overuse/rebound headaches.     Take sumatriptan 100 mg at the onset of a headache, may repeat once in 2 hours if needed. Max 3 times per week.      Over the counter preventive supplements for headaches/migraines   (can  "continue during breastfeeding)  (to take every day to help prevent headaches - not to take at the time of headache):  [x] Magnesium 500 mg 1-2 daily (If any diarrhea or upset stomach, decrease dose  as tolerated)  [x] Riboflavin (Vitamin B2) 400mg daily (FYI B2 may make your urine bright/neon yellow)     Prescription preventive medications for headaches/migraines   (to take every day to help prevent headaches - not to take at the time of headache):    Decrease Topiramate - Take 75 mg at bedtime x 1 week then 50 mg at bedtime x 1 week, then 25 mg at bedtime x 1 week then stop   Once at 50 mg of Topiramate, start Amitriptyline 10 mg x 2 weeks, then increase by 10 mg every 2 weeks until at the goal dose of 50 mg; stopping at the lowest effective dose.     *Typically these types of medications take time untill you see the benefit, although some may see improvement in days, often it may take weeks, especially if the medication is being titrated up to a beneficial level. Please contact us if there are any concerns or questions regarding the medication.      Self-Monitoring:  [x] Headache calendar. Each day dori a number from 0-10 indicating if there was a headache and how bad it was.  This can be used to monitor gradual improvement and is helpful to make medication adjustments. You can do this on paper or there is an GENNY for a smart phone called \"Migraine e Diary\".      Lifestyle Recommendations:  [x] SLEEP - Maintain a regular sleep schedule: Adults need at least 7-8 hours of uninterrupted a night. Maintain good sleep hygiene:  Going to bed and waking up at consistent times, avoiding excessive daytime naps, avoiding caffeinated beverages in the evening, avoid excessive stimulation in the evening and generally using bed primarily for sleeping.  One hour before bedtime would recommend turning lights down lower, decreasing your activity (may read quietly, listen to music at a low volume). When you get into bed, should " eliminate all technology (no texting, emailing, playing with your phone, iPad or tablet in bed).  [x] HYDRATION - Maintain good hydration.  Drink  2L of fluid a day (4 typical small water bottles)  [x] DIET - Maintain good nutrition. In particular don't skip meals and try and eat healthy balanced meals regularly.  [x] TRIGGERS - Look for other triggers and avoid them: Limit caffeine to 1-2 cups a day or less. Avoid dietary triggers that you have noticed bring on your headaches (this could include aged cheese, peanuts, MSG, aspartame and nitrates).  [x] EXERCISE - physical exercise as we all know is good for you in many ways, and not only is good for your heart, but also is beneficial for your mental health, cognitive health and  chronic pain/headaches. I would encourage at the least 5 days of physical exercise weekly for at least 30 minutes.   [x] TOBACCO CESSATION: Patient was educated regarding the impact of smoking and advised to quit. Willingness to quit was assessed and she is cutting back  Resources provided: [place] Quit Line 3-977-QUIT-NOW, [employer]     Education and Follow-up  [x] Please call with any questions or concerns. Of course if any new concerning symptoms go to the emergency department.  [x] Follow up in 3 months, sooner if needed  [x] Complete labs  [x] Schedule to see Ophthalmology

## 2024-05-22 DIAGNOSIS — E55.9 VITAMIN D DEFICIENCY: ICD-10-CM

## 2024-05-23 RX ORDER — ERGOCALCIFEROL 1.25 MG/1
50000 CAPSULE ORAL WEEKLY
Qty: 4 CAPSULE | Refills: 3 | Status: SHIPPED | OUTPATIENT
Start: 2024-05-23

## 2024-06-18 ENCOUNTER — TELEPHONE (OUTPATIENT)
Age: 39
End: 2024-06-18

## 2024-07-08 ENCOUNTER — TELEPHONE (OUTPATIENT)
Age: 39
End: 2024-07-08

## 2024-07-08 DIAGNOSIS — R39.9 UTI SYMPTOMS: Primary | ICD-10-CM

## 2024-07-08 NOTE — TELEPHONE ENCOUNTER
Patient states she started with UTI symptoms this morning of pressure, pain and urgency.  She is not in the area and is out camping.  She is requesting medication be sent to her pharmacy so that her mother can pick it up for her and have it brought to where she is.  Verified pharmacy is [facility] in [place].

## 2024-07-09 ENCOUNTER — APPOINTMENT (OUTPATIENT)
Dept: LAB | Facility: HOSPITAL | Age: 39
End: 2024-07-09
Payer: COMMERCIAL

## 2024-07-09 DIAGNOSIS — R39.9 UTI SYMPTOMS: Primary | ICD-10-CM

## 2024-07-09 DIAGNOSIS — G44.229 CHRONIC TENSION TYPE HEADACHE: ICD-10-CM

## 2024-07-09 DIAGNOSIS — R39.9 UTI SYMPTOMS: ICD-10-CM

## 2024-07-09 LAB
ANA SER QL IA: NEGATIVE
B BURGDOR IGG+IGM SER QL IA: NEGATIVE

## 2024-07-09 PROCEDURE — 87086 URINE CULTURE/COLONY COUNT: CPT

## 2024-07-09 PROCEDURE — 86038 ANTINUCLEAR ANTIBODIES: CPT

## 2024-07-09 PROCEDURE — 36415 COLL VENOUS BLD VENIPUNCTURE: CPT

## 2024-07-09 PROCEDURE — 86618 LYME DISEASE ANTIBODY: CPT

## 2024-07-09 RX ORDER — SULFAMETHOXAZOLE AND TRIMETHOPRIM 800; 160 MG/1; MG/1
1 TABLET ORAL 2 TIMES DAILY
Qty: 14 TABLET | Refills: 0 | Status: SHIPPED | OUTPATIENT
Start: 2024-07-09 | End: 2024-07-16

## 2024-07-11 LAB — BACTERIA UR CULT: NORMAL

## 2024-07-12 DIAGNOSIS — G44.229 CHRONIC TENSION TYPE HEADACHE: ICD-10-CM

## 2024-07-18 NOTE — TELEPHONE ENCOUNTER
Medication: Amitriptyline     Dose/Frequency:     Quantity:     Pharmacy: [facility] in [place]     Office:   [] PCP/Provider -   [x] Speciality/Provider - Neurology, Mony KERR    Does the patient have enough for 3 days?   [x] Yes   [] No - Send as HP to POD

## 2024-07-20 ENCOUNTER — OFFICE VISIT (OUTPATIENT)
Dept: URGENT CARE | Facility: MEDICAL CENTER | Age: 39
End: 2024-07-20
Payer: COMMERCIAL

## 2024-07-20 VITALS
DIASTOLIC BLOOD PRESSURE: 66 MMHG | OXYGEN SATURATION: 98 % | HEART RATE: 88 BPM | TEMPERATURE: 97.3 F | BODY MASS INDEX: 31.24 KG/M2 | WEIGHT: 182 LBS | SYSTOLIC BLOOD PRESSURE: 100 MMHG | RESPIRATION RATE: 20 BRPM

## 2024-07-20 DIAGNOSIS — K13.0 CHAPPED LIPS: Primary | ICD-10-CM

## 2024-07-20 PROCEDURE — 99213 OFFICE O/P EST LOW 20 MIN: CPT

## 2024-07-20 NOTE — PROGRESS NOTES
[facility]        NAME: Kortney Cummins is a 39 y.o. female  : 1985    MRN: [id number]  DATE: 2024  TIME: 10:50 AM    Assessment and Plan   Chapped lips [K13.0]  1. Chapped lips  mupirocin (BACTROBAN) 2 % ointment            Patient Instructions       Follow up with PCP in 3-5 days.  Proceed to  ER if symptoms worsen.    If tests are performed, our office will contact you with results only if changes need to made to the care plan discussed with you at the visit. [facility]    Chief Complaint     Chief Complaint   Patient presents with   • Lip Swelling     Upper and lower lip swelling. Started 2 weeks ago. Lips are very cracked itchy. Has tried all types of OTC lip balms and Vaseline. No relief. No fevers or cheers. [place]. And was in the sun a lot. States sometimes when she wipes her lips she feels lips are draining.          History of Present Illness       Patient started about 2 weeks ago with upper/lower lip swelling. She reports lips are cracked and also feel itchy. She has used multiple OTC lip balms and Vaseline without relief. She has not had any fever/chills. [place]ground [place] to onset and was out in the sun. Feels like at times it is draining. She reports symptoms have worsened over the past 2 weeks.       Used Aquaphor, Carmex and Blistex without improvement, reports when she would apply these it would burn and make things worse. Recently she changed to just straight Vaseline multiple times throughout the day. She has been keeping hydrated and drinking a lot of fluids. She has a new migraine medication which she started in April. Otherwise no changes in medications recently. No new foods/exposures that she is aware of.         Review of Systems   Review of Systems   Constitutional:  Negative for appetite change, chills, fatigue and fever.   HENT:  Positive for facial swelling. Negative for  congestion, postnasal drip, rhinorrhea, sinus pressure, sinus pain, sore throat and trouble swallowing.    Neurological:  Negative for dizziness, facial asymmetry, light-headedness and headaches.         Current Medications       Current Outpatient Medications:   •  albuterol (PROVENTIL HFA,VENTOLIN HFA) 90 mcg/act inhaler, inhale 2 puffs every 6 hours if needed for wheezing or shortness of breath, Disp: 25.5 g, Rfl: 0  •  amitriptyline (ELAVIL) 10 mg tablet, start 10mg at bedtime x 2 weeks; Increase by 10mg every 2 weeks until good effect on headaches/pain or reach 50mg daily, Disp: 150 tablet, Rfl: 0  •  betamethasone dipropionate (DIPROSONE) 0.05 % cream, Apply topically 2 (two) times a day, Disp: 45 g, Rfl: 3  •  levonorgestrel (MIRENA) 20 MCG/24HR IUD, 1 each by Intrauterine route, Disp: , Rfl:   •  levothyroxine 50 mcg tablet, TAKE 1 TABLET DAILY, Disp: 90 tablet, Rfl: 3  •  mupirocin (BACTROBAN) 2 % ointment, Apply topically 3 (three) times a day, Disp: 15 g, Rfl: 0  •  SUMAtriptan (IMITREX) 100 mg tablet, Take 1 tablet (100 mg total) by mouth as needed for migraine May repeat in 2 hours. Limit of 3/week or 9/month, Disp: 27 tablet, Rfl: 1  •  triamcinolone (KENALOG) 0.1 % cream, Apply topically 2 (two) times a day, Disp: 453.6 g, Rfl: 1  •  DULoxetine (CYMBALTA) 30 mg delayed release capsule, take 1 capsule by mouth once daily (Patient not taking: Reported on 7/20/2024), Disp: 30 capsule, Rfl: 3  •  ergocalciferol (VITAMIN D2) 50,000 units, take 1 capsule by mouth every week (Patient not taking: Reported on 7/20/2024), Disp: 4 capsule, Rfl: 3  •  ondansetron (ZOFRAN) 4 mg tablet, Take 1 tablet (4 mg total) by mouth every 8 (eight) hours as needed for nausea or vomiting for up to 4 days (Patient not taking: Reported on 1/11/2024), Disp: 12 tablet, Rfl: 0  •  topiramate (TOPAMAX) 25 mg tablet, Take 75 mg at bedtime x 1 week then 50 mg at bedtime x 1 week, then 25 mg at bedtime x 1 week then stop (Patient not  taking: Reported on 2024), Disp: 42 tablet, Rfl: 0    Current Allergies     Allergies as of 2024 - Reviewed 2024   Allergen Reaction Noted   • Cat hair extract  10/12/2016   • Dog epithelium  10/12/2016   • Nuts - food allergy  10/12/2016   • Pollen extract  10/12/2016            The following portions of the patient's history were reviewed and updated as appropriate: allergies, current medications, past family history, past medical history, past social history, past surgical history and problem list.     Past Medical History:   Diagnosis Date   • Encounter for vitamin deficiency screening    • Thyroid disease        Past Surgical History:   Procedure Laterality Date   •  SECTION         Family History   Problem Relation Age of Onset   • No Known Problems Mother    • No Known Problems Father    • Cancer Sister    • Diabetes Paternal Grandfather          Medications have been verified.        Objective   /66   Pulse 88   Temp (!) 97.3 °F (36.3 °C)   Resp 20   Wt 82.6 kg (182 lb)   SpO2 98%   BMI 31.24 kg/m²        Physical Exam     Physical Exam  Vitals and nursing note reviewed.   Constitutional:       General: She is awake.      Appearance: Normal appearance. She is well-developed and normal weight.   HENT:      Head: Normocephalic and atraumatic.      Mouth/Throat:      Lips: Pink.      Mouth: Mucous membranes are moist. No injury, lacerations, oral lesions or angioedema.      Comments: Lips chapped/cracked, irritated. She reports she has had drainage noted when wiping over her lips and reports it as foul smelling.     Cracking noted throughout the lips and along the vermilion boarder.   Cardiovascular:      Rate and Rhythm: Normal rate and regular rhythm.      Pulses: Normal pulses.      Heart sounds: Normal heart sounds.   Pulmonary:      Effort: Pulmonary effort is normal.      Breath sounds: Normal breath sounds.   Skin:     General: Skin is warm and dry.      Capillary  Refill: Capillary refill takes less than 2 seconds.      Findings: No rash.   Neurological:      General: No focal deficit present.      Mental Status: She is alert. Mental status is at baseline.   Psychiatric:         Mood and Affect: Mood normal.         Behavior: Behavior is cooperative.

## 2024-07-21 RX ORDER — AMITRIPTYLINE HYDROCHLORIDE 10 MG/1
TABLET, FILM COATED ORAL
Qty: 150 TABLET | Refills: 0 | Status: SHIPPED | OUTPATIENT
Start: 2024-07-21

## 2024-07-21 NOTE — TELEPHONE ENCOUNTER
Pt requesting refill of amitriptyline 10mg     Pt confirmed she is taking 20mg    New script pended below.     Terry Leonard I did not change sig or qty in case pt needs to increase in future. Please revise rx if needed. Thank you.

## 2024-07-21 NOTE — TELEPHONE ENCOUNTER
Kortney Cummins   to [facility] (supporting CIRILO Alfredo)       7/20/24  9:43 AM  Good morning. I was taking 20 mg of the medicine. Thank you

## 2024-07-21 NOTE — TELEPHONE ENCOUNTER
July 18, 2024  Domonique Merlos RN   to Kortney Cummins         7/18/24  7:26 AM  Good morning Kortney,     In order tor refill you medication request, can you please confirm the dose of Amitriptyline you are currently taking.     Thank you,  Domonique GONZALEZ     Last read by Kortney Cummins at  9:42 AM on 7/20/2024.

## 2024-07-22 DIAGNOSIS — R22.0 SWOLLEN LIP: Primary | ICD-10-CM

## 2024-07-22 DIAGNOSIS — B37.31 VAGINAL YEAST INFECTION: ICD-10-CM

## 2024-07-22 DIAGNOSIS — R21 RASH AND NONSPECIFIC SKIN ERUPTION: ICD-10-CM

## 2024-07-22 RX ORDER — METHYLPREDNISOLONE 4 MG/1
TABLET ORAL
Qty: 21 EACH | Refills: 0 | Status: SHIPPED | OUTPATIENT
Start: 2024-07-22

## 2024-07-22 RX ORDER — FLUCONAZOLE 150 MG/1
150 TABLET ORAL ONCE
Qty: 1 TABLET | Refills: 0 | Status: SHIPPED | OUTPATIENT
Start: 2024-07-22 | End: 2024-07-22

## 2024-07-22 RX ORDER — CEPHALEXIN 500 MG/1
500 CAPSULE ORAL EVERY 8 HOURS SCHEDULED
Qty: 30 CAPSULE | Refills: 0 | Status: SHIPPED | OUTPATIENT
Start: 2024-07-22 | End: 2024-08-01

## 2024-08-01 ENCOUNTER — HOSPITAL ENCOUNTER (EMERGENCY)
Facility: HOSPITAL | Age: 39
Discharge: HOME | End: 2024-08-01
Attending: EMERGENCY MEDICINE
Payer: COMMERCIAL

## 2024-08-01 VITALS
DIASTOLIC BLOOD PRESSURE: 94 MMHG | SYSTOLIC BLOOD PRESSURE: 131 MMHG | HEART RATE: 87 BPM | TEMPERATURE: 97.9 F | RESPIRATION RATE: 18 BRPM | OXYGEN SATURATION: 98 %

## 2024-08-01 DIAGNOSIS — K13.0 CRACKED LIPS: Primary | ICD-10-CM

## 2024-08-01 PROCEDURE — 99283 EMERGENCY DEPT VISIT LOW MDM: CPT

## 2024-08-01 PROCEDURE — 99283 EMERGENCY DEPT VISIT LOW MDM: CPT | Performed by: EMERGENCY MEDICINE

## 2024-08-01 RX ORDER — DIAPER,BRIEF,INFANT-TODD,DISP
EACH MISCELLANEOUS 2 TIMES DAILY
Qty: 28 G | Refills: 0 | Status: SHIPPED | OUTPATIENT
Start: 2024-08-01

## 2024-08-01 NOTE — DISCHARGE INSTRUCTIONS
Please use steroid ointment 2 times a day.  You may also apply Vaseline to keep lips moisturized.  Please call dermatology to make a follow-up appointment.

## 2024-08-01 NOTE — ED PROVIDER NOTES
History  Chief Complaint   Patient presents with    Medical Problem     Itchy, chapped and swollen lips for the past month. States that she has been seen at urgent care and given cream and placed on prednisone, but seems to be getting worse      HPI    39-year-old female presenting for evaluation of chapped lips.  Patient states she has had chapped lips for the past month.  She initially thought it was sunburn but has not gotten better and has been getting worse.  She states her lips are very itchy and dry.  She does states she has noticed cracks in her lips which are painful.  Denies any intraoral lesions.  She states she has occasionally noticed some swelling of her face.  Denies fevers.  Denies throat swelling or difficulty breathing.  Denies abdominal pain, nausea, vomiting, or diarrhea.  Patient states she was seen at urgent care and started on mupirocin.  She saw her primary care doctor who placed her on Keflex as well as steroids.  She states she finished the steroids and initially had some improvement but now symptoms are getting worse.  She is still taking Keflex now.  She states she does have history of eczema on her hands but has never had symptoms on her lips like this in the past.    Prior to Admission Medications   Prescriptions Last Dose Informant Patient Reported? Taking?   DULoxetine (CYMBALTA) 30 mg delayed release capsule  Self No No   Sig: take 1 capsule by mouth once daily   Patient not taking: Reported on 2024   SUMAtriptan (IMITREX) 100 mg tablet   No No   Sig: Take 1 tablet (100 mg total) by mouth as needed for migraine May repeat in 2 hours. Limit of 3/week or 9/month   albuterol (PROVENTIL HFA,VENTOLIN HFA) 90 mcg/act inhaler  Self No No   Sig: inhale 2 puffs every 6 hours if needed for wheezing or shortness of breath   amitriptyline (ELAVIL) 10 mg tablet   No No   Si mg at bedtime; may increase by 10mg every 2 weeks until good effect on headaches/pain or reach 50mg daily    betamethasone dipropionate (DIPROSONE) 0.05 % cream  Self No No   Sig: Apply topically 2 (two) times a day   cephalexin (KEFLEX) 500 mg capsule   No No   Sig: Take 1 capsule (500 mg total) by mouth every 8 (eight) hours for 10 days   ergocalciferol (VITAMIN D2) 50,000 units   No No   Sig: take 1 capsule by mouth every week   Patient not taking: Reported on 2024   levonorgestrel (MIRENA) 20 MCG/24HR IUD  Self Yes No   Si each by Intrauterine route   levothyroxine 50 mcg tablet  Self No No   Sig: TAKE 1 TABLET DAILY   methylPREDNISolone 4 MG tablet therapy pack   No No   Sig: Use as directed on package   mupirocin (BACTROBAN) 2 % ointment   No No   Sig: Apply topically 3 (three) times a day   ondansetron (ZOFRAN) 4 mg tablet   No No   Sig: Take 1 tablet (4 mg total) by mouth every 8 (eight) hours as needed for nausea or vomiting for up to 4 days   Patient not taking: Reported on 2024   topiramate (TOPAMAX) 25 mg tablet   No No   Sig: Take 75 mg at bedtime x 1 week then 50 mg at bedtime x 1 week, then 25 mg at bedtime x 1 week then stop   Patient not taking: Reported on 2024   triamcinolone (KENALOG) 0.1 % cream  Self No No   Sig: Apply topically 2 (two) times a day      Facility-Administered Medications: None       Past Medical History:   Diagnosis Date    Encounter for vitamin deficiency screening     Thyroid disease        Past Surgical History:   Procedure Laterality Date     SECTION         Family History   Problem Relation Age of Onset    No Known Problems Mother     No Known Problems Father     Cancer Sister     Diabetes Paternal Grandfather      I have reviewed and agree with the history as documented.    E-Cigarette/Vaping    E-Cigarette Use Current Every Day User      E-Cigarette/Vaping Substances    Nicotine No     THC No     CBD No     Flavoring No     Other No     Unknown No      Social History     Tobacco Use    Smoking status: Every Day     Types: Cigarettes    Smokeless  tobacco: Never   Vaping Use    Vaping status: Every Day   Substance Use Topics    Alcohol use: Yes     Comment: social    Drug use: No       Review of Systems   Constitutional:  Negative for appetite change, chills and fever.   HENT:  Positive for facial swelling. Negative for congestion, rhinorrhea and sore throat.    Respiratory:  Negative for cough and shortness of breath.    Cardiovascular:  Negative for chest pain.   Gastrointestinal:  Negative for abdominal pain, diarrhea, nausea and vomiting.   Musculoskeletal:  Negative for arthralgias and myalgias.   Skin:  Positive for rash.   Neurological:  Negative for dizziness, weakness, light-headedness, numbness and headaches.   All other systems reviewed and are negative.      Physical Exam  Physical Exam  Vitals and nursing note reviewed.   Constitutional:       General: She is not in acute distress.     Appearance: Normal appearance. She is well-developed and normal weight. She is not ill-appearing, toxic-appearing or diaphoretic.   HENT:      Head: Normocephalic and atraumatic.      Right Ear: External ear normal.      Left Ear: External ear normal.      Nose: Nose normal.      Mouth/Throat:      Mouth: Mucous membranes are moist.      Pharynx: Oropharynx is clear.      Comments: Dry cracked lips with dryness around the outside of the lips.  No intraoral lesions.   Eyes:      Extraocular Movements: Extraocular movements intact.      Conjunctiva/sclera: Conjunctivae normal.   Cardiovascular:      Rate and Rhythm: Normal rate and regular rhythm.      Pulses: Normal pulses.      Heart sounds: Normal heart sounds. No murmur heard.     No friction rub. No gallop.   Pulmonary:      Effort: Pulmonary effort is normal. No respiratory distress.      Breath sounds: Normal breath sounds. No wheezing or rales.   Abdominal:      General: There is no distension.      Palpations: Abdomen is soft.      Tenderness: There is no abdominal tenderness. There is no guarding or  rebound.   Musculoskeletal:         General: No tenderness.      Cervical back: Neck supple.   Skin:     General: Skin is warm and dry.      Coloration: Skin is not pale.      Findings: No erythema or rash.   Neurological:      General: No focal deficit present.      Mental Status: She is alert and oriented to person, place, and time.      Cranial Nerves: No cranial nerve deficit.      Sensory: No sensory deficit.      Motor: No weakness.   Psychiatric:         Mood and Affect: Mood normal.         Behavior: Behavior normal.         Vital Signs  ED Triage Vitals [08/01/24 1251]   Temperature Pulse Respirations Blood Pressure SpO2   97.9 °F (36.6 °C) 87 18 131/94 98 %      Temp Source Heart Rate Source Patient Position - Orthostatic VS BP Location FiO2 (%)   Temporal Monitor Sitting Right arm --      Pain Score       8           Vitals:    08/01/24 1251   BP: 131/94   Pulse: 87   Patient Position - Orthostatic VS: Sitting         Visual Acuity      ED Medications  Medications - No data to display    Diagnostic Studies  Results Reviewed       None                   No orders to display              Procedures  Procedures         ED Course                                 SBIRT 20yo+      Flowsheet Row Most Recent Value   Initial Alcohol Screen: US AUDIT-C     1. How often do you have a drink containing alcohol? 0 Filed at: 08/01/2024 1253   2. How many drinks containing alcohol do you have on a typical day you are drinking?  0 Filed at: 08/01/2024 1253   3b. FEMALE Any Age, or MALE 65+: How often do you have 4 or more drinks on one occassion? 0 Filed at: 08/01/2024 1253   Audit-C Score 0 Filed at: 08/01/2024 1253   QUIRINO: How many times in the past year have you...    Used an illegal drug or used a prescription medication for non-medical reasons? Never Filed at: 08/01/2024 1253                      Medical Decision Making      39-year-old female presenting for evaluation of chapped lips.   Patient has had dry cracked  lips for the past month, she appears to have very dry lips with cracking and dryness around the outside of the lips, no intraoral lesions.  No signs of secondary infection.  Will treat with hydrocortisone ointment for suspected eczema.  Will also have patient continue to use Vaseline for moisturizer.  Will provide referral for dermatology.  Discussed with patient strict return precautions.  Patient expressed understanding and was agreeable for discharge.           Disposition  Final diagnoses:   Cracked lips     Time reflects when diagnosis was documented in both MDM as applicable and the Disposition within this note       Time User Action Codes Description Comment    8/1/2024  1:05 PM Kristi Strickland Add [K13.0] Cracked lips           ED Disposition       ED Disposition   Discharge    Condition   Stable    Date/Time   Thu Aug 1, 2024 1305    Comment   Kortney Cummins discharge to home/self care.                   Follow-up Information       Follow up With Specialties Details Why Contact Info Additional Information    [facility], Nurse Practitioner Schedule an appointment as soon as possible for a visit   [address] [facility] [address]  [phone]       [facility] Schedule an appointment as soon as possible for a visit   [address] [facility] [address]  [phone] [facility], [address] [place] [address], [address]     [phone]            Patient's Medications   Discharge Prescriptions    HYDROCORTISONE 1 % OINTMENT    Apply topically 2 (two) times a day       Start Date: 8/1/2024  End Date: --       Order Dose: --       Quantity: 28 g    Refills: 0           PDMP Review         Value Time User    PDMP Reviewed  Yes 3/8/2021  3:34 PM CIRILO Marie            ED Provider  Electronically Signed by             Kristi Strickland MD  08/01/24 [id number]

## 2024-08-02 ENCOUNTER — VBI (OUTPATIENT)
Dept: INTERNAL MEDICINE CLINIC | Facility: CLINIC | Age: 39
End: 2024-08-02

## 2024-08-02 NOTE — TELEPHONE ENCOUNTER
08/02/24 10:04 AM    Patient contacted post ED visit, [facility] department spoke with patient/caregiver and outreach was successful. Message was routed to derm for ed f/u patient is very upset and nothing is working in over one month.    Thank you.  Swetha Loco MA  [facility]

## 2024-08-11 DIAGNOSIS — G44.229 CHRONIC TENSION TYPE HEADACHE: ICD-10-CM

## 2024-08-12 RX ORDER — AMITRIPTYLINE HYDROCHLORIDE 10 MG/1
TABLET ORAL
Qty: 150 TABLET | Refills: 0 | Status: SHIPPED | OUTPATIENT
Start: 2024-08-12

## 2024-08-20 DIAGNOSIS — F32.A ANXIETY AND DEPRESSION: ICD-10-CM

## 2024-08-20 DIAGNOSIS — F41.9 ANXIETY AND DEPRESSION: ICD-10-CM

## 2024-08-20 RX ORDER — DULOXETIN HYDROCHLORIDE 30 MG/1
30 CAPSULE, DELAYED RELEASE ORAL DAILY
Qty: 30 CAPSULE | Refills: 5 | Status: SHIPPED | OUTPATIENT
Start: 2024-08-20

## 2024-10-01 ENCOUNTER — TELEPHONE (OUTPATIENT)
Dept: DERMATOLOGY | Facility: CLINIC | Age: 39
End: 2024-10-01

## 2024-10-01 NOTE — TELEPHONE ENCOUNTER
[facility] [place] [facility]  Please have this patient get in ASAP for a ED f/u as per ED doctor. Very large lips for over one month and nothing is working. Thank You!    Left voicemail regarding scheduling an boo with our office. Provided my direct line

## 2024-10-22 ENCOUNTER — OFFICE VISIT (OUTPATIENT)
Dept: INTERNAL MEDICINE CLINIC | Facility: CLINIC | Age: 39
End: 2024-10-22
Payer: COMMERCIAL

## 2024-10-22 VITALS — OXYGEN SATURATION: 97 % | HEART RATE: 72 BPM | TEMPERATURE: 98 F

## 2024-10-22 DIAGNOSIS — J40 BRONCHITIS: Primary | ICD-10-CM

## 2024-10-22 PROCEDURE — 99213 OFFICE O/P EST LOW 20 MIN: CPT | Performed by: NURSE PRACTITIONER

## 2024-10-22 RX ORDER — DOXYCYCLINE 100 MG/1
100 CAPSULE ORAL 2 TIMES DAILY
Qty: 20 CAPSULE | Refills: 0 | Status: SHIPPED | OUTPATIENT
Start: 2024-10-22 | End: 2024-11-01

## 2024-10-22 RX ORDER — PREDNISONE 10 MG/1
TABLET ORAL
Qty: 45 TABLET | Refills: 0 | Status: SHIPPED | OUTPATIENT
Start: 2024-10-22

## 2024-10-22 NOTE — PROGRESS NOTES
Ambulatory Visit  Name: Kortney Cummins      : 1985      MRN: [id number]  Encounter Provider: CIRILO Marie  Encounter Date: 10/22/2024   Encounter department: [facility]    Assessment & Plan  Bronchitis    Orders:    doxycycline monohydrate (MONODOX) 100 mg capsule; Take 1 capsule (100 mg total) by mouth 2 (two) times a day for 10 days    predniSONE 10 mg tablet; 50 mg by mouth daily for 3 days, then 40 mg by mouth daily for 3 days, then 30 mg by mouth daily for 3 days, then 20 mg daily by mouth for 3 days then 10 mg daily by mouth for 3 days     Will start on Doxy and a prednisone tapered dose. Can take OTC Delsym. Will follow up if any worsening of symptoms.    History of Present Illness     Kortney is for an acute visit. She is having cough, congestion and sore throat. She denies any fever she is wheezing. She is taking OTC cough medication. She offers no other issues.          Review of Systems   HENT:  Positive for congestion.    Respiratory:  Positive for cough and wheezing.    All other systems reviewed and are negative.          Objective     There were no vitals taken for this visit.    Physical Exam  Constitutional:       Appearance: Normal appearance. She is normal weight.   HENT:      Right Ear: Tympanic membrane, ear canal and external ear normal.      Left Ear: Tympanic membrane, ear canal and external ear normal.      Nose: Congestion present.      Mouth/Throat:      Mouth: Mucous membranes are moist.      Pharynx: Oropharynx is clear.   Cardiovascular:      Rate and Rhythm: Normal rate and regular rhythm.      Pulses: Normal pulses.      Heart sounds: Normal heart sounds.   Pulmonary:      Effort: Pulmonary effort is normal.      Breath sounds: Normal breath sounds.   Skin:     General: Skin is warm and dry.      Capillary Refill: Capillary refill takes less than 2 seconds.   Neurological:      General: No focal deficit present.      Mental Status: She is  alert and oriented to person, place, and time. Mental status is at baseline.   Psychiatric:         Mood and Affect: Mood normal.         Behavior: Behavior normal.         Thought Content: Thought content normal.         Judgment: Judgment normal.

## 2024-11-04 ENCOUNTER — OFFICE VISIT (OUTPATIENT)
Dept: NEUROLOGY | Facility: CLINIC | Age: 39
End: 2024-11-04
Payer: COMMERCIAL

## 2024-11-04 VITALS
HEART RATE: 72 BPM | BODY MASS INDEX: 32.34 KG/M2 | TEMPERATURE: 97.9 F | WEIGHT: 188.4 LBS | DIASTOLIC BLOOD PRESSURE: 72 MMHG | SYSTOLIC BLOOD PRESSURE: 102 MMHG | OXYGEN SATURATION: 98 %

## 2024-11-04 DIAGNOSIS — G44.229 CHRONIC TENSION TYPE HEADACHE: Primary | ICD-10-CM

## 2024-11-04 PROCEDURE — 99214 OFFICE O/P EST MOD 30 MIN: CPT

## 2024-11-04 RX ORDER — AMITRIPTYLINE HYDROCHLORIDE 10 MG/1
TABLET ORAL
Qty: 60 TABLET | Refills: 0 | Status: SHIPPED | OUTPATIENT
Start: 2024-11-04

## 2024-11-04 RX ORDER — AMITRIPTYLINE HYDROCHLORIDE 10 MG/1
20 TABLET ORAL
Qty: 180 TABLET | Refills: 3 | Status: SHIPPED | OUTPATIENT
Start: 2024-11-04

## 2024-11-04 RX ORDER — SUMATRIPTAN 100 MG/1
100 TABLET, FILM COATED ORAL AS NEEDED
Qty: 27 TABLET | Refills: 1 | Status: SHIPPED | OUTPATIENT
Start: 2024-11-04

## 2024-11-04 NOTE — ASSESSMENT & PLAN NOTE
Kortney Cummins is a 39 year old female who presents to follow up on her chronic tension type headaches. She was last seen 4/3/2024. At the time of that visit she was tapered off of Topiramate and she started Amitriptyline 10 mg with a taper up to an initial goal dose of 50 mg/daily stopping at the lowest effective dose. She was lost to follow up unfortunately. She returns today and states she was taking Amitriptyline 20 mg daily and was having no migraines at all. She states she ran out of refills about 2 months ago and since then her headaches have been daily again. She states when she was on amitriptyline 20 mg she did not have any side effects. She would like to resume this ASAP. She states in the interim she has been using Sumatriptan as needed which is effective. She denies any new headache characteristics or new neurologic complaints today.    We will plan to have her resume Amitriptyline 10 mg x 1 week, then increase to 20 mg daily thereafter. 1 month supply sent to her local pharmacy, with 90 day supply and refills sent to her mail order. Sumatriptan also refilled. She will follow up via [facility] in 6 weeks to ensure headache have improved, if not we will consider increasing Amitriptyline further. Otherwise she will follow up at least annually; sooner if needed.    Orders:    amitriptyline (ELAVIL) 10 mg tablet; 20 mg at bedtime    amitriptyline (ELAVIL) 10 mg tablet; Take 2 tablets (20 mg total) by mouth daily at bedtime    SUMAtriptan (IMITREX) 100 mg tablet; Take 1 tablet (100 mg total) by mouth as needed for migraine May repeat in 2 hours. Limit of 3/week or 9/month

## 2024-11-04 NOTE — PROGRESS NOTES
Ambulatory Visit  Name: Kortney Cummins      : 1985      MRN: [id number]  Encounter Provider: CIRILO Alfredo  Encounter Date: 2024   Encounter department: [facility] [place]    Assessment & Plan  Chronic tension type headache  Kortney Cummins is a 39 year old female who presents to follow up on her chronic tension type headaches. She was last seen 4/3/2024. At the time of that visit she was tapered off of Topiramate and she started Amitriptyline 10 mg with a taper up to an initial goal dose of 50 mg/daily stopping at the lowest effective dose. She was lost to follow up unfortunately. She returns today and states she was taking Amitriptyline 20 mg daily and was having no migraines at all. She states she ran out of refills about 2 months ago and since then her headaches have been daily again. She states when she was on amitriptyline 20 mg she did not have any side effects. She would like to resume this ASAP. She states in the interim she has been using Sumatriptan as needed which is effective. She denies any new headache characteristics or new neurologic complaints today.    We will plan to have her resume Amitriptyline 10 mg x 1 week, then increase to 20 mg daily thereafter. 1 month supply sent to her local pharmacy, with 90 day supply and refills sent to her mail order. Sumatriptan also refilled. She will follow up via [facility] in 6 weeks to ensure headache have improved, if not we will consider increasing Amitriptyline further. Otherwise she will follow up at least annually; sooner if needed.    Orders:    amitriptyline (ELAVIL) 10 mg tablet; 20 mg at bedtime    amitriptyline (ELAVIL) 10 mg tablet; Take 2 tablets (20 mg total) by mouth daily at bedtime    SUMAtriptan (IMITREX) 100 mg tablet; Take 1 tablet (100 mg total) by mouth as needed for migraine May repeat in 2 hours. Limit of 3/week or 9/month      Patient Instructions   Headache/migraine treatment:   - When you  "have a moderate to severe headache, you should seek rest, initiate relaxation and apply cold compresses to the head.      Abortive medications (for immediate treatment of a headache):   It is ok to take ibuprofen, acetaminophen or naproxen (Advil, Tylenol,  Aleve, Excedrin) if they help your headaches you should limit these to no more than 3 times a week to avoid medication overuse/rebound headaches.      Take sumatriptan 100 mg at the onset of a headache, may repeat once in 2 hours if needed. Max 3 times per week.      Over the counter preventive supplements for headaches/migraines   (can continue during breastfeeding)  (to take every day to help prevent headaches - not to take at the time of headache):  [x] Magnesium 500 mg 1-2 daily (If any diarrhea or upset stomach, decrease dose  as tolerated)  [x] Riboflavin (Vitamin B2) 400mg daily (FYI B2 may make your urine bright/neon yellow)     Prescription preventive medications for headaches/migraines   (to take every day to help prevent headaches - not to take at the time of headache):     Resume Amitriptyline 10 mg x 1 week, then increase to 20 mg daily      *Typically these types of medications take time untill you see the benefit, although some may see improvement in days, often it may take weeks, especially if the medication is being titrated up to a beneficial level. Please contact us if there are any concerns or questions regarding the medication.      Self-Monitoring:  [x] Headache calendar. Each day dori a number from 0-10 indicating if there was a headache and how bad it was.  This can be used to monitor gradual improvement and is helpful to make medication adjustments. You can do this on paper or there is an GENNY for a smart phone called \"Migraine e Diary\".      Lifestyle Recommendations:  [x] SLEEP - Maintain a regular sleep schedule: Adults need at least 7-8 hours of uninterrupted a night. Maintain good sleep hygiene:  Going to bed and waking up at " consistent times, avoiding excessive daytime naps, avoiding caffeinated beverages in the evening, avoid excessive stimulation in the evening and generally using bed primarily for sleeping.  One hour before bedtime would recommend turning lights down lower, decreasing your activity (may read quietly, listen to music at a low volume). When you get into bed, should eliminate all technology (no texting, emailing, playing with your phone, iPad or tablet in bed).  [x] HYDRATION - Maintain good hydration.  Drink  2L of fluid a day (4 typical small water bottles)  [x] DIET - Maintain good nutrition. In particular don't skip meals and try and eat healthy balanced meals regularly.  [x] TRIGGERS - Look for other triggers and avoid them: Limit caffeine to 1-2 cups a day or less. Avoid dietary triggers that you have noticed bring on your headaches (this could include aged cheese, peanuts, MSG, aspartame and nitrates).  [x] EXERCISE - physical exercise as we all know is good for you in many ways, and not only is good for your heart, but also is beneficial for your mental health, cognitive health and  chronic pain/headaches. I would encourage at the least 5 days of physical exercise weekly for at least 30 minutes.   [x] TOBACCO CESSATION: Patient was educated regarding the impact of smoking and advised to quit. Willingness to quit was assessed and she is cutting back  Resources provided: [place] Quit Line 6-522-QUIT-NOW, [email or web address]     Education and Follow-up  [x] Please call with any questions or concerns. Of course if any new concerning symptoms go to the emergency department.  [x] Follow up in 1 year, sooner if needed     History of Present Illness     HPI Kortney Cummins is a 39 year old female who presents to follow up on her chronic tension type headaches. She was last seen 4/3/2024. At the time of that visit she was tapered off of Topiramate and she started Amitriptyline 10 mg with a taper up to an initial goal dose of 50  mg/daily stopping at the lowest effective dose. She was also prescribed sumatriptan to use as needed and was advised to initiate vitamin supplementation with magnesium and riboflavin. She was also advised to see Ophthalmology given her visual complaints with and without headache, complete Lyme and ADELE testing to complete her work up and follow up in 3 months. She was lost to follow up unfortunately.     She returns today and states she was taking Amitriptyline 20 mg daily and was having no migraines at all. She states she ran out of refills about 2 months ago and since then her headaches have been daily again. She states when she was on amitriptyline 20 mg she did not have any side effects. She would like to resume this ASAP. She states in the interim she has been using Sumatriptan as needed which is effective. She was seen by Ophthalmology and was diagnosed with an astigmatism and prescribed glasses. She denies any new headache characteristics or new neurologic complaints today.    Prior HPI:  She reports having headaches after the birth of her 7 year old child but they have become more frequent and severe over the last 1 year. She is presently maintained on Topiramate 100 mg daily for her headaches, as well as cymbalta 30 mg for her anxiety and reports using Excedrin migraine and ibuprofen as needed.     Description of Headaches:  Location of pain: left-sided unilateral, occipital, frontal  Radiation of pain?:anterior neck  Character of pain: pressure  Severity of pain: 8/10  Accompanying symptoms:blurred vision and spots in her vision (worse with a headache but is also occurring outside of having a headache)  Prodromal sx?: none  Rapidity of onset: gradual  Typical duration of individual headache: 6 hours  Frequency of headaches: was every day but not with using blue light glasses about 3 times per week  Are you ever headache free?no  Are most headaches similar in presentation? yes  Exacerbating  "factors:none  Typical precipitants: stress, straining at stool     Temporal Pattern of Headaches:  Worst time of day: evening  Awaken from sleep?: no  Seasonal pattern?: only while working during the school year; no headaches in the summer  'Clustering' of HA's over time? no  Overall pattern since problem began: gradually improving     Degree of Functional Impairment: mild; is able to \"push through\"     Current Use of Meds to Treat HA:  Abortive meds? Ibuprofen 400 mg-800 mg 2-3 times a day, past 2-3 months has been using Excedrin migraine- helps a little   Daily use? No; no longer takes Ibuprofen and only used Excedrin prn   Preventive meds? Topiramate 100 mg at bedtime  Non-Medical/Alternative treatments for HA: no     Additional Relevant History:  History of head/neck trauma? no  History of head/neck surgery? no  Family h/o headache problems?no  Family history of aneurysms? no  Exposure to carbon monoxide? no  Substance use: alcohol: weekends socially, illicit drugs: none, tobacco: cigarettes 6/day (trying to quit over the last month), caffeine: coffee 1-2 cup daily  Water: 60 oz daily  Sleep: 7-8 hours a night  Mood: Anxiety>Depression; uncontrolled  Children/Pregnancy: 1 biological child and 1 step child. No plans for future pregnancy. Has IUD in place  Diet: eats lunch (11:30) and dinner, has coffee in the morning  Vitamins: She is taking B12 and Vitamin D replacements (recently started). Does not take any other multivitamins   Has not seen Ophthalmology      Prior Evaluation/Treatments:  Have you seen another physician for your headaches? no  Prior work-up: MRI brain 1/19/2024  Trigger point injections? no  Botox injections? no  Epidural injections? no  Prior PREVENTATIVE medications:  none; only topiramate  Prior ABORTIVE mediations: none    Review of Systems   Constitutional:  Negative for appetite change, fatigue and fever.   HENT: Negative.  Negative for hearing loss, tinnitus, trouble swallowing and " voice change.    Eyes: Negative.  Negative for photophobia, pain and visual disturbance.   Respiratory: Negative.  Negative for shortness of breath.    Cardiovascular: Negative.  Negative for palpitations.   Gastrointestinal:  Positive for nausea. Negative for vomiting.   Endocrine: Negative.  Negative for cold intolerance.   Genitourinary: Negative.  Negative for dysuria, frequency and urgency.   Musculoskeletal:  Negative for back pain, gait problem, myalgias, neck pain and neck stiffness.   Skin: Negative.  Negative for rash.   Allergic/Immunologic: Negative.    Neurological:  Positive for headaches (everyday, 8-10/10 pain, may last all day). Negative for dizziness, tremors, seizures, syncope, facial asymmetry, speech difficulty, weakness, light-headedness and numbness.   Hematological: Negative.  Does not bruise/bleed easily.   Psychiatric/Behavioral: Negative.  Negative for confusion, hallucinations and sleep disturbance.    All other systems reviewed and are negative.    I have personally reviewed the MA's review of systems and made changes as necessary.    Current Outpatient Medications on File Prior to Visit   Medication Sig Dispense Refill    albuterol (PROVENTIL HFA,VENTOLIN HFA) 90 mcg/act inhaler inhale 2 puffs every 6 hours if needed for wheezing or shortness of breath 25.5 g 0    betamethasone dipropionate (DIPROSONE) 0.05 % cream Apply topically 2 (two) times a day 45 g 3    DULoxetine (CYMBALTA) 30 mg delayed release capsule take 1 capsule by mouth once daily 30 capsule 5    hydrocortisone 1 % ointment Apply topically 2 (two) times a day 28 g 0    levonorgestrel (MIRENA) 20 MCG/24HR IUD 1 each by Intrauterine route      levothyroxine 50 mcg tablet TAKE 1 TABLET DAILY 90 tablet 3    mupirocin (BACTROBAN) 2 % ointment Apply topically 3 (three) times a day 15 g 0    triamcinolone (KENALOG) 0.1 % cream Apply topically 2 (two) times a day 453.6 g 1    [DISCONTINUED] amitriptyline (ELAVIL) 10 mg tablet 20  MG AT BEDTIME; MAY INCREASE BY 10MG EVERY 2 WEEKS UNTIL GOOD EFFECT ON HEADACHES/PAIN  OR REACH 50MG DAILY 150 tablet 0    [DISCONTINUED] SUMAtriptan (IMITREX) 100 mg tablet Take 1 tablet (100 mg total) by mouth as needed for migraine May repeat in 2 hours. Limit of 3/week or 9/month 27 tablet 1    ondansetron (ZOFRAN) 4 mg tablet Take 1 tablet (4 mg total) by mouth every 8 (eight) hours as needed for nausea or vomiting for up to 4 days (Patient not taking: Reported on 11/4/2024) 12 tablet 0    [DISCONTINUED] ergocalciferol (VITAMIN D2) 50,000 units take 1 capsule by mouth every week (Patient not taking: Reported on 7/20/2024) 4 capsule 3    [DISCONTINUED] predniSONE 10 mg tablet 50 mg by mouth daily for 3 days, then 40 mg by mouth daily for 3 days, then 30 mg by mouth daily for 3 days, then 20 mg daily by mouth for 3 days then 10 mg daily by mouth for 3 days (Patient not taking: Reported on 11/4/2024) 45 tablet 0    [DISCONTINUED] topiramate (TOPAMAX) 25 mg tablet Take 75 mg at bedtime x 1 week then 50 mg at bedtime x 1 week, then 25 mg at bedtime x 1 week then stop (Patient not taking: Reported on 7/20/2024) 42 tablet 0     No current facility-administered medications on file prior to visit.        Objective     /72 (BP Location: Right arm, Patient Position: Sitting, Cuff Size: Adult)   Pulse 72   Temp 97.9 °F (36.6 °C) (Temporal)   Wt 85.5 kg (188 lb 6.4 oz)   SpO2 98%   BMI 32.34 kg/m²     Neurologic Exam  On neurological examination patient is alert, awake, oriented and in no distress. Speech is fluent without dysarthria or aphasia. She is able to rise easily without assistance from a seated position. Casual gait is normal including stance, stride, and arm swing.      Administrative Statements     I have spent a total time of 20 minutes in caring for this patient on the day of the visit/encounter including Diagnostic results, Prognosis, Risks and benefits of tx options, Instructions for management,  Patient and family education, Importance of tx compliance, Risk factor reductions, Impressions, Counseling / Coordination of care, Documenting in the medical record, Reviewing / ordering tests, medicine, procedures  , and Obtaining or reviewing history  .

## 2024-11-04 NOTE — PATIENT INSTRUCTIONS
"Headache/migraine treatment:   - When you have a moderate to severe headache, you should seek rest, initiate relaxation and apply cold compresses to the head.      Abortive medications (for immediate treatment of a headache):   It is ok to take ibuprofen, acetaminophen or naproxen (Advil, Tylenol,  Aleve, Excedrin) if they help your headaches you should limit these to no more than 3 times a week to avoid medication overuse/rebound headaches.      Take sumatriptan 100 mg at the onset of a headache, may repeat once in 2 hours if needed. Max 3 times per week.      Over the counter preventive supplements for headaches/migraines   (can continue during breastfeeding)  (to take every day to help prevent headaches - not to take at the time of headache):  [x] Magnesium 500 mg 1-2 daily (If any diarrhea or upset stomach, decrease dose  as tolerated)  [x] Riboflavin (Vitamin B2) 400mg daily (FYI B2 may make your urine bright/neon yellow)     Prescription preventive medications for headaches/migraines   (to take every day to help prevent headaches - not to take at the time of headache):     Resume Amitriptyline 10 mg x 1 week, then increase to 20 mg daily      *Typically these types of medications take time untill you see the benefit, although some may see improvement in days, often it may take weeks, especially if the medication is being titrated up to a beneficial level. Please contact us if there are any concerns or questions regarding the medication.      Self-Monitoring:  [x] Headache calendar. Each day dori a number from 0-10 indicating if there was a headache and how bad it was.  This can be used to monitor gradual improvement and is helpful to make medication adjustments. You can do this on paper or there is an GENNY for a smart phone called \"Migraine e Diary\".      Lifestyle Recommendations:  [x] SLEEP - Maintain a regular sleep schedule: Adults need at least 7-8 hours of uninterrupted a night. Maintain good sleep " hygiene:  Going to bed and waking up at consistent times, avoiding excessive daytime naps, avoiding caffeinated beverages in the evening, avoid excessive stimulation in the evening and generally using bed primarily for sleeping.  One hour before bedtime would recommend turning lights down lower, decreasing your activity (may read quietly, listen to music at a low volume). When you get into bed, should eliminate all technology (no texting, emailing, playing with your phone, iPad or tablet in bed).  [x] HYDRATION - Maintain good hydration.  Drink  2L of fluid a day (4 typical small water bottles)  [x] DIET - Maintain good nutrition. In particular don't skip meals and try and eat healthy balanced meals regularly.  [x] TRIGGERS - Look for other triggers and avoid them: Limit caffeine to 1-2 cups a day or less. Avoid dietary triggers that you have noticed bring on your headaches (this could include aged cheese, peanuts, MSG, aspartame and nitrates).  [x] EXERCISE - physical exercise as we all know is good for you in many ways, and not only is good for your heart, but also is beneficial for your mental health, cognitive health and  chronic pain/headaches. I would encourage at the least 5 days of physical exercise weekly for at least 30 minutes.   [x] TOBACCO CESSATION: Patient was educated regarding the impact of smoking and advised to quit. Willingness to quit was assessed and she is cutting back  Resources provided: [place] Quit Line 1-386-QUIT-NOW, [email or web address]     Education and Follow-up  [x] Please call with any questions or concerns. Of course if any new concerning symptoms go to the emergency department.  [x] Follow up in 1 year, sooner if needed

## 2024-11-15 DIAGNOSIS — J45.20 MILD INTERMITTENT ASTHMA WITHOUT COMPLICATION: ICD-10-CM

## 2024-11-15 RX ORDER — ALBUTEROL SULFATE 90 UG/1
1 INHALANT RESPIRATORY (INHALATION) EVERY 6 HOURS PRN
Qty: 25.5 G | Refills: 1 | Status: SHIPPED | OUTPATIENT
Start: 2024-11-15

## 2024-11-15 NOTE — TELEPHONE ENCOUNTER
Reason for call:   [x] Refill   [] Prior Auth  [] Other:     Office:   [x] PCP/Provider -   [] Specialty/Provider - CIRILO Marie     Medication:     albuterol (PROVENTIL HFA,VENTOLIN HFA) 90 mcg/act inhaler       Dose/Frequency: nhale 2 puffs every 6 hours if needed for wheezing or shortness of breath,     Quantity: 25.5 g    Pharmacy: [id number]  [place] [address]     Does the patient have enough for 3 days?   [] Yes   [x] No - Send as HP to POD

## 2024-12-16 DIAGNOSIS — E03.9 HYPOTHYROIDISM, UNSPECIFIED TYPE: ICD-10-CM

## 2024-12-17 RX ORDER — LEVOTHYROXINE SODIUM 50 UG/1
50 TABLET ORAL DAILY
Qty: 90 TABLET | Refills: 1 | Status: SHIPPED | OUTPATIENT
Start: 2024-12-17

## 2025-02-24 DIAGNOSIS — F32.A ANXIETY AND DEPRESSION: ICD-10-CM

## 2025-02-24 DIAGNOSIS — F41.9 ANXIETY AND DEPRESSION: ICD-10-CM

## 2025-02-24 RX ORDER — DULOXETIN HYDROCHLORIDE 30 MG/1
30 CAPSULE, DELAYED RELEASE ORAL DAILY
Qty: 90 CAPSULE | Refills: 1 | Status: SHIPPED | OUTPATIENT
Start: 2025-02-24

## 2025-02-24 NOTE — TELEPHONE ENCOUNTER
Pharmacy change for this refill   Reason for call:   [x] Refill   [] Prior Auth  [] Other:     Office:   [x] PCP/Provider - Tatyana Zheng  [] Specialty/Provider -     Medication: Duloxetine    Dose/Frequency: 30 mg Daily     Quantity: 90    Pharmacy: [facility]    Does the patient have enough for 3 days?   [x] Yes   [] No - Send as HP to POD

## 2025-03-07 DIAGNOSIS — J45.20 MILD INTERMITTENT ASTHMA WITHOUT COMPLICATION: Primary | ICD-10-CM

## 2025-03-07 RX ORDER — ALBUTEROL SULFATE 90 UG/1
1 INHALANT RESPIRATORY (INHALATION) EVERY 6 HOURS PRN
Qty: 25.5 G | Refills: 0 | Status: SHIPPED | OUTPATIENT
Start: 2025-03-07

## 2025-03-07 RX ORDER — ALBUTEROL SULFATE 90 UG/1
2 INHALANT RESPIRATORY (INHALATION) EVERY 6 HOURS PRN
Qty: 8.5 G | Refills: 0 | Status: SHIPPED | OUTPATIENT
Start: 2025-03-07

## 2025-03-07 NOTE — TELEPHONE ENCOUNTER
Reason for call:   [x] Refill   [] Prior Auth  [] Other:     Office:   [x] PCP/Provider -   [] Specialty/Provider -     Medication: Albuterol HFA    Dose/Frequency: 1 puff q6h prn     Quantity: 8.5g    Pharmacy: [id number] - [place] - [address] [facility] [phone]     Medication: Albuterol HFA    Dose/Frequency: 1 puff q6h prn     Quantity: 25.5g    Pharmacy:[facility] [address] [phone]     Local Pharmacy   Does the patient have enough for 3 days?   [] Yes   [x] No - Send as HP to POD    Mail Away Pharmacy   Does the patient have enough for 10 days?   [] Yes   [x] No - Send as HP to POD

## 2025-03-09 ENCOUNTER — OFFICE VISIT (OUTPATIENT)
Dept: URGENT CARE | Facility: MEDICAL CENTER | Age: 40
End: 2025-03-09
Payer: COMMERCIAL

## 2025-03-09 VITALS
HEART RATE: 99 BPM | HEIGHT: 64 IN | OXYGEN SATURATION: 98 % | DIASTOLIC BLOOD PRESSURE: 74 MMHG | RESPIRATION RATE: 14 BRPM | WEIGHT: 187 LBS | BODY MASS INDEX: 31.92 KG/M2 | SYSTOLIC BLOOD PRESSURE: 110 MMHG | TEMPERATURE: 96.8 F

## 2025-03-09 DIAGNOSIS — H10.33 ACUTE CONJUNCTIVITIS OF BOTH EYES, UNSPECIFIED ACUTE CONJUNCTIVITIS TYPE: Primary | ICD-10-CM

## 2025-03-09 PROCEDURE — 99213 OFFICE O/P EST LOW 20 MIN: CPT

## 2025-03-09 RX ORDER — POLYMYXIN B SULFATE AND TRIMETHOPRIM 1; 10000 MG/ML; [USP'U]/ML
1 SOLUTION OPHTHALMIC EVERY 4 HOURS
Qty: 10 ML | Refills: 0 | Status: SHIPPED | OUTPATIENT
Start: 2025-03-09

## 2025-03-09 RX ORDER — MOMETASONE FUROATE 1 MG/G
OINTMENT TOPICAL
COMMUNITY
Start: 2024-12-19

## 2025-03-09 NOTE — LETTER
March 9, 2025     Patient: Kortney Cummins   YOB: 1985   Date of Visit: 3/9/2025       To Whom it May Concern:    Kortney Cummins was seen in my clinic on 3/9/2025. She may return to school on 03/11/2025 .    If you have any questions or concerns, please don't hesitate to call.         Sincerely,          CIRILO Bautista        CC: No Recipients

## 2025-03-09 NOTE — PATIENT INSTRUCTIONS
APPEARANCE: Alert, oriented and in no acute distress. Very irritable and snappy at staff  CARDIAC: Normal rate and rhythm, no murmur heard.   PERIPHERAL VASCULAR: peripheral pulses present. Normal cap refill. Edema to bilateral lower legs. Warm to touch. Obese  RESPIRATORY:Normal rate and effort, breath sounds clear bilaterally throughout chest. Respirations are equal and unlabored no obvious signs of distress.  GASTRO: soft, bowel sounds normal, no tenderness, no abdominal distention. Obese  MUSC: Limited ROM due to obesity and aging process. No bony tenderness or soft tissue tenderness. No obvious deformity.  SKIN: Skin is warm and dry, normal skin turgor, mucous membranes moist.  NEURO: 5/5 strength major flexors/extensors bilaterally. Sensory intact to light touch bilaterally. Solway coma scale: eyes open spontaneously-4, oriented & converses-5, obeys commands-6. No neurological abnormalities. Very irritable, snapping at staff.  MENTAL STATUS: awake, alert and aware of environment.  EYE: PERRL, both eyes: pupils brisk and reactive to light. Normal size.  ENT: EARS: no obvious drainage. NOSE: no active bleeding.   Pt complains of hypertension- change in meds x 1 month ago, she stopped taking it and started back on her past BP pill.      You may take over the counter Tylenol (Acetaminophen) and/or Motrin (Ibuprofen) as needed, as directed on packaging.   Be sure to get plenty of rest, and drinking fluids to remain hydrated.     Please follow up with your primary provider in the next several days. Should you have any worsening of symptoms, or lack of improvement please be re-evaluated. If needed for significant concerns, consider 911 or ER evaluation.     There are THREE (3) types of Conjunctivitis.   ONLY 1 type requires the use of antibiotic drops.     Contact lenses should be avoided during any episodes of conjunctivitis.    1-Allergic Conjunctivitis: Symptoms consist of eye redness, itching, and increased tears. Treatment includes avoiding the allergen and antihistamines medications either by mouth or directly into the eye.    There is no need to miss school with allergic conjunctivitis.    2-Viral Conjunctivitis: Symptoms consist of itchy/uncomfortable irritation to the eyes, tearing, redness, discharge (particularly in the morning). This is usually associated with a viral illness. It is still important to wash hands thoroughly to avoid sharing germs.  This will often occur with sinus congestion, cough, runny nose.   Students may return back to school with viral conjunctivitis provided they are fever free x 24 hours without fever reducing medicine    3-Bacterial Conjunctivitis: more commonly associated with discharge (pus), which can lead to eyelids sticking together.  The discharge from the eyes will continue throughout the day not only in the morning.  The discharge is usually a green/yellow color.  This sometimes occurs with an ear infection.   Bacterial conjunctivitis should be treated with eyedrops for at least 24 hours prior to returning to school.    At times viral conjunctivitis may become a bacterial conjunctivitis if the patient is rubbing their eyes and introduces a bacterium into the area of the eye.  I will often provide a written  prescription for drops so that if this occurs you can start the drops without needing to return.  However, the use of antibiotic drops and a viral conjunctivitis will not improve your symptoms, and should be reserved for bacterial conjunctivitis only.

## 2025-03-09 NOTE — PROGRESS NOTES
[facility]        NAME: Kortney Cummins is a 39 y.o. female  : 1985    MRN: [id number]  DATE: 2025  TIME: 12:13 PM    Assessment and Plan   Acute conjunctivitis of both eyes, unspecified acute conjunctivitis type [H10.33]  1. Acute conjunctivitis of both eyes, unspecified acute conjunctivitis type  polymyxin b-trimethoprim (POLYTRIM) ophthalmic solution            Patient Instructions       Follow up with PCP in 3-5 days.  Proceed to  ER if symptoms worsen.    If tests are performed, our office will contact you with results only if changes need to made to the care plan discussed with you at the visit. You can review your full results on [facility].    Chief Complaint     Chief Complaint   Patient presents with   • Conjunctivitis     Started this am. Both eyes crusted shut, swelling, glassy, watery.          History of Present Illness       Patient here with reports of waking up with eyes being 'pasted shut'. She reports she has continued to have drainage from her eyes. She has not had any pain but feel puffy and vision is 'weird' blurry but clear type thing from drainage. She has not had any known ill contact. 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 She has not had any recent cold symptoms. She does not wear contacts. No fevers or chills.        Review of Systems   Review of Systems   Constitutional:  Negative for chills, fatigue and fever.   HENT:  Negative for congestion, postnasal drip, rhinorrhea, sinus pain, sore throat and trouble swallowing.    Eyes:  Positive for discharge and redness. Negative for photophobia.   Respiratory:  Negative for cough and shortness of breath.    Gastrointestinal:  Negative for abdominal pain, constipation, diarrhea, nausea and vomiting.   Skin:  Negative for color change and rash.   Neurological:  Negative for dizziness, light-headedness and headaches.         Current Medications       Current Outpatient Medications:   •  albuterol (ProAir HFA)  90 mcg/act inhaler, Inhale 2 puffs every 6 (six) hours as needed for wheezing, Disp: 8.5 g, Rfl: 0  •  albuterol (PROVENTIL HFA,VENTOLIN HFA) 90 mcg/act inhaler, Inhale 1 puff every 6 (six) hours as needed for wheezing, Disp: 25.5 g, Rfl: 0  •  amitriptyline (ELAVIL) 10 mg tablet, 20 mg at bedtime, Disp: 60 tablet, Rfl: 0  •  betamethasone dipropionate (DIPROSONE) 0.05 % cream, Apply topically 2 (two) times a day, Disp: 45 g, Rfl: 3  •  DULoxetine (CYMBALTA) 30 mg delayed release capsule, Take 1 capsule (30 mg total) by mouth daily, Disp: 90 capsule, Rfl: 1  •  hydrocortisone 1 % ointment, Apply topically 2 (two) times a day, Disp: 28 g, Rfl: 0  •  levonorgestrel (MIRENA) 20 MCG/24HR IUD, 1 each by Intrauterine route, Disp: , Rfl:   •  levothyroxine 50 mcg tablet, TAKE 1 TABLET DAILY, Disp: 90 tablet, Rfl: 1  •  mometasone (ELOCON) 0.1 % ointment, APPLY TO AFFECTED AREAS ONCE DAILY FOR 2 WEEKS THEN USE ON WEEKENDS ONLY, Disp: , Rfl:   •  polymyxin b-trimethoprim (POLYTRIM) ophthalmic solution, Administer 1 drop to both eyes every 4 (four) hours, Disp: 10 mL, Rfl: 0  •  amitriptyline (ELAVIL) 10 mg tablet, Take 2 tablets (20 mg total) by mouth daily at bedtime (Patient not taking: Reported on 3/9/2025), Disp: 180 tablet, Rfl: 3  •  mupirocin (BACTROBAN) 2 % ointment, Apply topically 3 (three) times a day (Patient not taking: Reported on 3/9/2025), Disp: 15 g, Rfl: 0  •  ondansetron (ZOFRAN) 4 mg tablet, Take 1 tablet (4 mg total) by mouth every 8 (eight) hours as needed for nausea or vomiting for up to 4 days (Patient not taking: Reported on 11/4/2024), Disp: 12 tablet, Rfl: 0  •  SUMAtriptan (IMITREX) 100 mg tablet, Take 1 tablet (100 mg total) by mouth as needed for migraine May repeat in 2 hours. Limit of 3/week or 9/month (Patient not taking: Reported on 3/9/2025), Disp: 27 tablet, Rfl: 1  •  triamcinolone (KENALOG) 0.1 % cream, Apply topically 2 (two) times a day (Patient not taking: Reported on 3/9/2025), Disp:  "453.6 g, Rfl: 1    Current Allergies     Allergies as of 2025 - Reviewed 2025   Allergen Reaction Noted   • Cat dander  10/12/2016   • Dog epithelium  10/12/2016   • Nuts - food allergy  10/12/2016   • Pollen extract  10/12/2016            The following portions of the patient's history were reviewed and updated as appropriate: allergies, current medications, past family history, past medical history, past social history, past surgical history and problem list.     Past Medical History:   Diagnosis Date   • Encounter for vitamin deficiency screening 2024   • Thyroid disease        Past Surgical History:   Procedure Laterality Date   •  SECTION         Family History   Problem Relation Age of Onset   • No Known Problems Mother    • No Known Problems Father    • Cancer Sister    • Diabetes Paternal Grandfather          Medications have been verified.        Objective   /74   Pulse 99   Temp (!) 96.8 °F (36 °C)   Resp 14   Ht 5' 4\" (1.626 m)   Wt 84.8 kg (187 lb)   SpO2 98%   BMI 32.10 kg/m²        Physical Exam     Physical Exam  Vitals and nursing note reviewed.   Constitutional:       General: She is not in acute distress.     Appearance: Normal appearance. She is normal weight. She is not ill-appearing.   HENT:      Head: Normocephalic and atraumatic.      Right Ear: Tympanic membrane, ear canal and external ear normal.      Left Ear: Tympanic membrane, ear canal and external ear normal.      Nose: Rhinorrhea present. Rhinorrhea is clear.      Mouth/Throat:      Lips: Pink.      Mouth: Mucous membranes are moist.      Pharynx: Oropharynx is clear.   Eyes:      General: Lids are normal.      Extraocular Movements: Extraocular movements intact.      Right eye: Normal extraocular motion and no nystagmus.      Left eye: Normal extraocular motion and no nystagmus.      Conjunctiva/sclera:      Right eye: Right conjunctiva is injected.      Left eye: Left conjunctiva is injected.     "  Pupils: Pupils are equal, round, and reactive to light.      Comments: Slight injected appearance of the b/l eyes.    Cardiovascular:      Rate and Rhythm: Normal rate and regular rhythm.      Pulses: Normal pulses.      Heart sounds: Normal heart sounds.   Pulmonary:      Effort: Pulmonary effort is normal.      Breath sounds: Normal breath sounds.   Abdominal:      General: Abdomen is flat. Bowel sounds are normal.      Palpations: Abdomen is soft.   Musculoskeletal:         General: Normal range of motion.      Cervical back: Full passive range of motion without pain, normal range of motion and neck supple.   Skin:     General: Skin is warm.      Capillary Refill: Capillary refill takes less than 2 seconds.      Findings: No rash.   Neurological:      General: No focal deficit present.      Mental Status: She is alert and oriented to person, place, and time.   Psychiatric:         Mood and Affect: Mood normal.         Behavior: Behavior normal.

## 2025-06-12 DIAGNOSIS — E03.9 HYPOTHYROIDISM, UNSPECIFIED TYPE: ICD-10-CM

## 2025-06-12 RX ORDER — LEVOTHYROXINE SODIUM 50 UG/1
50 TABLET ORAL DAILY
Qty: 100 TABLET | Refills: 3 | Status: SHIPPED | OUTPATIENT
Start: 2025-06-12

## 2025-08-19 ENCOUNTER — TELEPHONE (OUTPATIENT)
Age: 40
End: 2025-08-19

## 2025-08-19 ENCOUNTER — APPOINTMENT (OUTPATIENT)
Dept: LAB | Facility: HOSPITAL | Age: 40
End: 2025-08-19
Payer: COMMERCIAL

## 2025-08-19 DIAGNOSIS — R39.9 UTI SYMPTOMS: ICD-10-CM

## 2025-08-19 DIAGNOSIS — R39.9 UTI SYMPTOMS: Primary | ICD-10-CM

## 2025-08-19 LAB
BILIRUB UR QL STRIP: NEGATIVE
CLARITY UR: NORMAL
COLOR UR: NORMAL
GLUCOSE UR STRIP-MCNC: NEGATIVE MG/DL
HGB UR QL STRIP.AUTO: NEGATIVE
KETONES UR STRIP-MCNC: NEGATIVE MG/DL
LEUKOCYTE ESTERASE UR QL STRIP: NEGATIVE
NITRITE UR QL STRIP: NEGATIVE
PH UR STRIP.AUTO: 7 [PH]
PROT UR STRIP-MCNC: NEGATIVE MG/DL
SP GR UR STRIP.AUTO: 1.01 (ref 1–1.03)
UROBILINOGEN UR STRIP-ACNC: <2 MG/DL

## 2025-08-19 PROCEDURE — 81003 URINALYSIS AUTO W/O SCOPE: CPT

## 2025-08-19 PROCEDURE — 87086 URINE CULTURE/COLONY COUNT: CPT

## 2025-08-20 LAB — BACTERIA UR CULT: NORMAL
